# Patient Record
Sex: FEMALE | Race: WHITE | NOT HISPANIC OR LATINO | Employment: FULL TIME | URBAN - METROPOLITAN AREA
[De-identification: names, ages, dates, MRNs, and addresses within clinical notes are randomized per-mention and may not be internally consistent; named-entity substitution may affect disease eponyms.]

---

## 2017-01-16 ENCOUNTER — HOSPITAL ENCOUNTER (OUTPATIENT)
Dept: RADIOLOGY | Facility: CLINIC | Age: 37
Discharge: HOME | End: 2017-01-16
Attending: INTERNAL MEDICINE
Payer: COMMERCIAL

## 2017-01-16 DIAGNOSIS — E04.1 NODULAR THYROID DISEASE: ICD-10-CM

## 2017-01-16 DIAGNOSIS — E06.9 THYROIDITIS: ICD-10-CM

## 2017-01-16 DIAGNOSIS — E06.3 HASHIMOTO'S THYROIDITIS: ICD-10-CM

## 2017-01-16 PROCEDURE — 76536 US EXAM OF HEAD AND NECK: CPT | Mod: 26,,, | Performed by: RADIOLOGY

## 2017-01-16 PROCEDURE — 76536 US EXAM OF HEAD AND NECK: CPT | Mod: TC,PO

## 2017-02-16 ENCOUNTER — TELEPHONE (OUTPATIENT)
Dept: BARIATRICS | Facility: CLINIC | Age: 37
End: 2017-02-16

## 2017-02-16 ENCOUNTER — OFFICE VISIT (OUTPATIENT)
Dept: FAMILY MEDICINE | Facility: CLINIC | Age: 37
End: 2017-02-16
Payer: COMMERCIAL

## 2017-02-16 ENCOUNTER — DOCUMENTATION ONLY (OUTPATIENT)
Dept: FAMILY MEDICINE | Facility: CLINIC | Age: 37
End: 2017-02-16

## 2017-02-16 VITALS
HEART RATE: 63 BPM | WEIGHT: 293 LBS | SYSTOLIC BLOOD PRESSURE: 123 MMHG | BODY MASS INDEX: 57.52 KG/M2 | HEIGHT: 60 IN | DIASTOLIC BLOOD PRESSURE: 85 MMHG | RESPIRATION RATE: 18 BRPM | TEMPERATURE: 98 F

## 2017-02-16 DIAGNOSIS — E03.9 ACQUIRED HYPOTHYROIDISM: Primary | ICD-10-CM

## 2017-02-16 DIAGNOSIS — E28.2 POLYCYSTIC OVARIAN SYNDROME: ICD-10-CM

## 2017-02-16 DIAGNOSIS — E88.810 DYSMETABOLIC SYNDROME: ICD-10-CM

## 2017-02-16 DIAGNOSIS — Z23 NEED FOR PROPHYLACTIC VACCINATION WITH TETANUS-DIPHTHERIA (TD): ICD-10-CM

## 2017-02-16 DIAGNOSIS — J45.20 MILD INTERMITTENT ASTHMA WITHOUT COMPLICATION: ICD-10-CM

## 2017-02-16 DIAGNOSIS — E66.01 MORBID OBESITY, UNSPECIFIED OBESITY TYPE: ICD-10-CM

## 2017-02-16 DIAGNOSIS — J06.9 ACUTE URI: ICD-10-CM

## 2017-02-16 PROCEDURE — 90471 IMMUNIZATION ADMIN: CPT | Mod: S$GLB,,, | Performed by: FAMILY MEDICINE

## 2017-02-16 PROCEDURE — 90714 TD VACC NO PRESV 7 YRS+ IM: CPT | Mod: S$GLB,,, | Performed by: FAMILY MEDICINE

## 2017-02-16 PROCEDURE — 99213 OFFICE O/P EST LOW 20 MIN: CPT | Mod: 25,S$GLB,, | Performed by: FAMILY MEDICINE

## 2017-02-16 PROCEDURE — 99999 PR PBB SHADOW E&M-EST. PATIENT-LVL III: CPT | Mod: PBBFAC,,, | Performed by: FAMILY MEDICINE

## 2017-02-16 RX ORDER — ALBUTEROL SULFATE 90 UG/1
2 AEROSOL, METERED RESPIRATORY (INHALATION) EVERY 6 HOURS PRN
Qty: 18 G | Refills: 11 | Status: SHIPPED | OUTPATIENT
Start: 2017-02-16 | End: 2017-09-21 | Stop reason: SDUPTHER

## 2017-02-16 RX ORDER — BUDESONIDE 0.5 MG/2ML
0.5 INHALANT ORAL DAILY
Qty: 100 ML | Refills: 11 | Status: SHIPPED | OUTPATIENT
Start: 2017-02-16 | End: 2018-09-04 | Stop reason: SDUPTHER

## 2017-02-16 RX ORDER — FLUTICASONE PROPIONATE AND SALMETEROL 500; 50 UG/1; UG/1
1 POWDER RESPIRATORY (INHALATION) 2 TIMES DAILY
Qty: 60 EACH | Refills: 11 | Status: SHIPPED | OUTPATIENT
Start: 2017-02-16 | End: 2017-09-21 | Stop reason: SDUPTHER

## 2017-02-16 NOTE — MR AVS SNAPSHOT
[facility]  [address]  [place] [address]  Phone: [phone]  Fax: [phone]                  Meagan Bran   2017 8:30 AM   Office Visit    Description:  Female : 1980   Provider:  Julius Jeffers Jr., MD   Department:  [facility]           Reason for Visit     Follow-up           Diagnoses this Visit        Comments    Mild intermittent asthma without complication    -  Primary     Need for prophylactic vaccination with tetanus-diphtheria (TD)                To Do List           Future Appointments        Provider Department Dept Phone    2017 8:30 AM Rhys Hidalgo MD [facility]Di[facility] [phone]    2017 10:30 AM Jluius Jeffers Jr., MD [facility] [phone]      Goals (5 Years of Data)     None      Follow-Up and Disposition     Return in about 6 months (around 2017).       These Medications        Disp Refills Start End    budesonide (PULMICORT) 0.5 mg/2 mL nebulizer solution 100 mL 2017     Take 2 mLs (0.5 mg total) by nebulization once daily. - Nebulization    Pharmacy: [id number] [place] - [address] Ph #: [phone]       fluticasone-salmeterol 500-50 mcg/dose (ADVAIR DISKUS) 500-50 mcg/dose DsDv diskus inhaler 60 each 2017     Inhale 1 puff into the lungs 2 (two) times daily. - Inhalation    Pharmacy: [id number] - [place] - [address] Ph #: [phone]       albuterol 90 mcg/actuation inhaler 18 g 11 2017    Inhale 2 puffs into the lungs every 6 (six) hours as needed for Wheezing. Rescue - Inhalation    Pharmacy: [id number] - [place] - [address] Ph #: [phone]         [facility] On [facility]     [facility] On Call Nurse Care Line -  Assistance  Registered nurses in the [facility] On Call Center provide clinical advisement, health education, appointment booking, and other advisory services.  Call for this free  service at [phone].             Medications           Message regarding Medications     Verify the changes and/or additions to your medication regime listed below are the same as discussed with your clinician today.  If any of these changes or additions are incorrect, please notify your healthcare provider.        START taking these NEW medications        Refills    albuterol 90 mcg/actuation inhaler 11    Sig: Inhale 2 puffs into the lungs every 6 (six) hours as needed for Wheezing. Rescue    Class: Normal    Route: Inhalation      CHANGE how you are taking these medications     Start Taking Instead of    budesonide (PULMICORT) 0.5 mg/2 mL nebulizer solution budesonide (PULMICORT) 0.5 mg/2 mL nebulizer solution    Dosage:  Take 2 mLs (0.5 mg total) by nebulization once daily. Dosage:  Take 0.5 mg by nebulization once daily.    Reason for Change:  Reorder            Verify that the below list of medications is an accurate representation of the medications you are currently taking.  If none reported, the list may be blank. If incorrect, please contact your healthcare provider. Carry this list with you in case of emergency.           Current Medications     albuterol (ACCUNEB) 1.25 mg/3 mL Nebu Take 3 mLs (1.25 mg total) by nebulization every 6 (six) hours as needed.    budesonide (PULMICORT) 0.5 mg/2 mL nebulizer solution Take 2 mLs (0.5 mg total) by nebulization once daily.    cholecalciferol, vitamin D3, (VITAMIN D3) 5,000 unit Tab Take 5,000 Units by mouth once daily.    escitalopram oxalate (LEXAPRO) 10 MG tablet TAKE ONE TABLET BY MOUTH ONCE DAILY    fluticasone-salmeterol 500-50 mcg/dose (ADVAIR DISKUS) 500-50 mcg/dose DsDv diskus inhaler Inhale 1 puff into the lungs 2 (two) times daily.    hydrOXYzine HCl (ATARAX) 25 MG tablet Take 1 tablet (25 mg total) by mouth every 6 to 8 hours as needed for Itching.    KRILL OIL ORAL Take 2 capsules by mouth once daily.    levothyroxine (SYNTHROID) 75 MCG tablet  Take 1 tablet (75 mcg total) by mouth once daily.    metformin (GLUCOPHAGE) 500 MG tablet Take 1 tablet (500 mg total) by mouth 2 (two) times daily with meals.    multivitamin (ONE DAILY MULTIVITAMIN) per tablet Take 1 tablet by mouth once daily.    albuterol 90 mcg/actuation inhaler Inhale 2 puffs into the lungs every 6 (six) hours as needed for Wheezing. Rescue           Clinical Reference Information           Your Vitals Were     BP Pulse Temp Resp Height Weight    123/85 (BP Location: Right arm, Patient Position: Sitting, BP Method: Automatic) 63 97.6 °F (36.4 °C) (Oral) 18 5' (1.524 m) 134.8 kg (297 lb 2.9 oz)    BMI                58.04 kg/m2          Blood Pressure          Most Recent Value    BP  123/85      Allergies as of 2/16/2017     No Known Allergies      Immunizations Administered on Date of Encounter - 2/16/2017     None      Instructions      Weight Management: Getting Started  Healthy bodies come in all shapes and sizes. Not all bodies are made to be thin. For some people, a healthy weight is higher than the average weight listed on weight charts. Your healthcare provider can help you decide on a healthy weight for you.    Reasons to lose weight  Losing weight can help with some health problems, such as high blood pressure, heart disease, diabetes, sleep apnea, and arthritis. You may also feel more energy.  Set your long-term goal  Your goal doesn't even have to be a specific weight. You may decide on a fitness goal (such as being able to walk 10 miles a week), or a health goal (such as lowering your blood pressure). Choose a goal that is measurable and reasonable, so you know when you've reached it. A goal of reaching a BMI of less than 25 is not always reasonable (or possible).   Make an action plan  Habits dont change overnight. Setting your goals too high can leave you feeling discouraged if you cant reach them. Be realistic. Choose one or two small changes you can make now. Set an action  plan for how you are going to make these changes. When you can stick to this plan, keep making a few more small changes. Taking small steps will help you stay on the path to success.  Track your progress  Write down your goals. Then, keep a daily record of your progress. Write down what you eat and how active you are. This record lets you look back on how much youve done. It may also help when youre feeling frustrated. Reward yourself for success. Even if you dont reach every goal, give yourself credit for what you do get done.  Get support  Encouragement from others can help make losing weight easier. Ask your family members and friends for support. They may even want to join you. Also look to your healthcare provider, registered dietitian, and  for help. Your local hospital can give you more information about nutrition, exercise, and weight loss.  Date Last Reviewed: 1/31/2016 © 2000-2016 [employer]. [address] [place] [address]. All rights reserved. This information is not intended as a substitute for professional medical care. Always follow your healthcare professional's instructions.        Walking for Fitness  Fitness walking has something for everyone, even people who are already fit. Walking is one of the safest ways to condition your body aerobically. It can boost energy, help you lose weight, and reduce stress.    Physical benefits  · Walking strengthens your heart and lungs, and tones your muscles.  · When walking, your feet land with less impact than in other sports. This reduces chances of muscle, bone, and joint injury.  · Regular walking improves your cholesterol levels and lowers your risk of heart disease. And it helps you control your blood sugar if you have diabetes.  · Walking is a weight-bearing activity, which helps maintain bone density. This can help prevent osteoporosis.  Personal rewards  · Taking walks can help you relax and manage  stress. And fitness walking may make you feel better about yourself.  · Walking can help you sleep better at night and make you less likely to be depressed.  · Regular walking may help maintain your memory as you get older.  · Walking is a great way to spend extra time with friends and family members. Be sure to invite your dog along!  Q&A about fitness walking  Q: Will walking keep me fit?  A: Yes. Regular walking at the right pace gives you all the benefits of other aerobic activities, such as jogging and swimming.  Q: Will walking help me lose weight and keep it off?  A: Yes. Per mile, walking can burn as many calories as jogging. Your health care provider can help work walking into your weight-loss plan.  Q: Is walking safe for my health?  A: Yes. Walking is safe if you have high blood pressure, diabetes, heart disease, or other conditions. Talk to your health care provider before you start.  Date Last Reviewed: 5/9/2015 © 2000-2016 [employer]. [address] [place] [address]. All rights reserved. This information is not intended as a substitute for professional medical care. Always follow your healthcare professional's instructions.             Language Assistance Services     ATTENTION: Language assistance services are available, free of charge. Please call [phone].      ATENCIÓN: Si lolis leonora, tiene a cooper disposición servicios gratuitos de asistencia lingüística. Llame al [phone].     [facility] Ý: N?u b?n nói Ti?ng Vi?t, có các d?ch v? h? tr? ngôn ng? mi?n phí dành cho b?n. G?i s? [phone].         [facility] complies with applicable Federal civil rights laws and does not discriminate on the basis of race, color, national origin, age, disability, or sex.

## 2017-02-16 NOTE — PROGRESS NOTES
Pre-Visit Chart Review  For Appointment Scheduled on 2/16/17    Health Maintenance Due   Topic Date Due    TETANUS VACCINE  09/01/2000    Influenza Vaccine  08/01/2016

## 2017-02-16 NOTE — PROGRESS NOTES
2 patient identifiers used ( name &  ).  Administered Td vaccine right deltoid IM.  Patient tolerated well, no bleeding at insertion site noted.  Pain scale 0/10.  Aseptic technique maintained.  Vaccine information given to patient.

## 2017-02-16 NOTE — PATIENT INSTRUCTIONS
Weight Management: Getting Started  Healthy bodies come in all shapes and sizes. Not all bodies are made to be thin. For some people, a healthy weight is higher than the average weight listed on weight charts. Your healthcare provider can help you decide on a healthy weight for you.    Reasons to lose weight  Losing weight can help with some health problems, such as high blood pressure, heart disease, diabetes, sleep apnea, and arthritis. You may also feel more energy.  Set your long-term goal  Your goal doesn't even have to be a specific weight. You may decide on a fitness goal (such as being able to walk 10 miles a week), or a health goal (such as lowering your blood pressure). Choose a goal that is measurable and reasonable, so you know when you've reached it. A goal of reaching a BMI of less than 25 is not always reasonable (or possible).   Make an action plan  Habits dont change overnight. Setting your goals too high can leave you feeling discouraged if you cant reach them. Be realistic. Choose one or two small changes you can make now. Set an action plan for how you are going to make these changes. When you can stick to this plan, keep making a few more small changes. Taking small steps will help you stay on the path to success.  Track your progress  Write down your goals. Then, keep a daily record of your progress. Write down what you eat and how active you are. This record lets you look back on how much youve done. It may also help when youre feeling frustrated. Reward yourself for success. Even if you dont reach every goal, give yourself credit for what you do get done.  Get support  Encouragement from others can help make losing weight easier. Ask your family members and friends for support. They may even want to join you. Also look to your healthcare provider, registered dietitian, and  for help. Your local hospital can give you more information about nutrition, exercise, and  weight loss.  Date Last Reviewed: 1/31/2016 © 2000-2016 [employer]. [address] [place] [address], [place] [address]. All rights reserved. This information is not intended as a substitute for professional medical care. Always follow your healthcare professional's instructions.        Walking for Fitness  Fitness walking has something for everyone, even people who are already fit. Walking is one of the safest ways to condition your body aerobically. It can boost energy, help you lose weight, and reduce stress.    Physical benefits  · Walking strengthens your heart and lungs, and tones your muscles.  · When walking, your feet land with less impact than in other sports. This reduces chances of muscle, bone, and joint injury.  · Regular walking improves your cholesterol levels and lowers your risk of heart disease. And it helps you control your blood sugar if you have diabetes.  · Walking is a weight-bearing activity, which helps maintain bone density. This can help prevent osteoporosis.  Personal rewards  · Taking walks can help you relax and manage stress. And fitness walking may make you feel better about yourself.  · Walking can help you sleep better at night and make you less likely to be depressed.  · Regular walking may help maintain your memory as you get older.  · Walking is a great way to spend extra time with friends and family members. Be sure to invite your dog along!  Q&A about fitness walking  Q: Will walking keep me fit?  A: Yes. Regular walking at the right pace gives you all the benefits of other aerobic activities, such as jogging and swimming.  Q: Will walking help me lose weight and keep it off?  A: Yes. Per mile, walking can burn as many calories as jogging. Your health care provider can help work walking into your weight-loss plan.  Q: Is walking safe for my health?  A: Yes. Walking is safe if you have high blood pressure, diabetes, heart disease, or other conditions. Talk to your health  care provider before you start.  Date Last Reviewed: 5/9/2015  © 7479-2094 [employer]. [address] [place] [address], [place] [address]. All rights reserved. This information is not intended as a substitute for professional medical care. Always follow your healthcare professional's instructions.

## 2017-02-16 NOTE — TELEPHONE ENCOUNTER
----- Message from Chel Rodriguez sent at 2/16/2017  9:40 AM CST -----  Pt needs an appt for the weight loss clinic  Please call her @ [phone]  Thanks !

## 2017-02-18 NOTE — PROGRESS NOTES
Subjective:       Patient ID: Meagan Bran is a 36 y.o. female.    Chief Complaint: Hypothyroidism    HPI Comments: Patient presents here for six-month follow-up of hypothyroidism.  She was referred to endocrinology and the endocrinology notes were reviewed.  She has also been diagnosed with polycystic ovary syndrome as well as metabolic syndrome.  She states that the endocrinologist suggested that she start on metformin for both of those problems but she wanted to discuss with me first.  I agree that she should take the metformin for the PCOS as well as a metabolic syndrome as these are beneficial for side effects as well as may help with the insulin resistance in PCOS and metabolic syndrome.  She is stable otherwise although her weight has increased 14 pounds since I saw her last.  I have strongly encouraged her to exercise, follow a good weight loss diet, and lose weight.  As far as screening, she does need to update her tetanus.  She also states she has had a cough for the last week and has had some green mucus from her nose.  She denies any fever or chills.  She is an  and all of her children have had colds also.  She has been using her respiratory treatments to help with this.    Review of Systems   Constitutional: Positive for unexpected weight change (weight is increased 14 pounds in 6 months). Negative for chills, fatigue and fever.   HENT: Negative for congestion, ear pain, postnasal drip and sore throat.    Respiratory: Positive for cough and wheezing. Negative for shortness of breath.    Cardiovascular: Negative for chest pain and palpitations.   Gastrointestinal: Negative for abdominal pain, constipation, diarrhea, nausea and vomiting.   Musculoskeletal: Negative for arthralgias and back pain.   Neurological: Negative for dizziness, light-headedness and headaches.   Psychiatric/Behavioral: Negative for sleep disturbance. The patient is nervous/anxious (controlled at thi time).         Objective:      Physical Exam   Constitutional: She is oriented to person, place, and time.   Morbidly obese female in no distress at this time   HENT:   Head: Normocephalic and atraumatic.   Right Ear: External ear normal.   Left Ear: External ear normal.   Nose: Nose normal.   Mouth/Throat: Oropharynx is clear and moist.   Neck: Neck supple. No thyromegaly present.   Cardiovascular: Normal rate, regular rhythm, normal heart sounds and intact distal pulses.    No murmur heard.  Pulmonary/Chest: Effort normal and breath sounds normal. She has no wheezes. She has no rales.   Musculoskeletal: Normal range of motion. She exhibits no edema or tenderness.   Lymphadenopathy:     She has no cervical adenopathy.   Neurological: She is alert and oriented to person, place, and time. No cranial nerve deficit.   Psychiatric: She has a normal mood and affect. Her behavior is normal.   Vitals reviewed.      Assessment:       1. Acquired hypothyroidism    2. Mild intermittent asthma without complication    3. Acute URI    4. Polycystic ovarian syndrome    5. Dysmetabolic syndrome    6. Morbid obesity, unspecified obesity type    7. BMI 50.0-59.9, adult    8. Need for prophylactic vaccination with tetanus-diphtheria (TD)        Plan:       1.  Patient is encouraged to use the metformin as noted above  2.  Continue all other medications  3.  Stressed the importance of diet and exercise for weight loss  4.  Continue to follow-up with endocrinology  5.  Td today  6.  Symptomatic treatment for her upper respiratory infection; no need for an anabiotic so this time  7.  Follow-up with me in 6 months

## 2017-04-10 LAB — PAP SMEAR: NORMAL

## 2017-04-20 ENCOUNTER — HOSPITAL ENCOUNTER (OUTPATIENT)
Dept: RADIOLOGY | Facility: CLINIC | Age: 37
Discharge: HOME | End: 2017-04-20
Attending: SPECIALIST
Payer: COMMERCIAL

## 2017-04-20 DIAGNOSIS — R10.31 COMBINED ABDOMINAL AND PELVIC PAIN, RIGHT: ICD-10-CM

## 2017-04-20 PROCEDURE — 76856 US EXAM PELVIC COMPLETE: CPT | Mod: 26,,, | Performed by: RADIOLOGY

## 2017-04-20 PROCEDURE — 76856 US EXAM PELVIC COMPLETE: CPT | Mod: TC,PO

## 2017-08-07 DIAGNOSIS — F41.9 ANXIETY: ICD-10-CM

## 2017-08-07 RX ORDER — ESCITALOPRAM OXALATE 10 MG/1
TABLET ORAL
Qty: 30 TABLET | Refills: 10 | Status: SHIPPED | OUTPATIENT
Start: 2017-08-07 | End: 2018-07-28 | Stop reason: SDUPTHER

## 2017-09-19 ENCOUNTER — DOCUMENTATION ONLY (OUTPATIENT)
Dept: FAMILY MEDICINE | Facility: CLINIC | Age: 37
End: 2017-09-19

## 2017-09-19 NOTE — PROGRESS NOTES
Pre-Visit Chart Review  For Appointment Scheduled on (date) 9/21/17    Health Maintenance Due   Topic Date Due    Influenza Vaccine  08/01/2017

## 2017-09-21 ENCOUNTER — OFFICE VISIT (OUTPATIENT)
Dept: FAMILY MEDICINE | Facility: CLINIC | Age: 37
End: 2017-09-21
Payer: COMMERCIAL

## 2017-09-21 VITALS
SYSTOLIC BLOOD PRESSURE: 138 MMHG | HEIGHT: 60 IN | DIASTOLIC BLOOD PRESSURE: 87 MMHG | TEMPERATURE: 98 F | HEART RATE: 60 BPM | WEIGHT: 293 LBS | BODY MASS INDEX: 57.52 KG/M2

## 2017-09-21 DIAGNOSIS — E88.810 DYSMETABOLIC SYNDROME: ICD-10-CM

## 2017-09-21 DIAGNOSIS — E03.9 ACQUIRED HYPOTHYROIDISM: Primary | ICD-10-CM

## 2017-09-21 DIAGNOSIS — F32.89 OTHER DEPRESSION: ICD-10-CM

## 2017-09-21 DIAGNOSIS — J45.20 MILD INTERMITTENT ASTHMA WITHOUT COMPLICATION: ICD-10-CM

## 2017-09-21 DIAGNOSIS — E28.2 POLYCYSTIC OVARIAN SYNDROME: ICD-10-CM

## 2017-09-21 DIAGNOSIS — F41.9 ANXIETY: ICD-10-CM

## 2017-09-21 PROCEDURE — 99213 OFFICE O/P EST LOW 20 MIN: CPT | Mod: S$GLB,,, | Performed by: FAMILY MEDICINE

## 2017-09-21 PROCEDURE — 99999 PR PBB SHADOW E&M-EST. PATIENT-LVL III: CPT | Mod: PBBFAC,,, | Performed by: FAMILY MEDICINE

## 2017-09-21 PROCEDURE — 3008F BODY MASS INDEX DOCD: CPT | Mod: S$GLB,,, | Performed by: FAMILY MEDICINE

## 2017-09-21 RX ORDER — ALBUTEROL SULFATE 90 UG/1
2 AEROSOL, METERED RESPIRATORY (INHALATION) EVERY 6 HOURS PRN
Qty: 18 G | Refills: 11 | Status: SHIPPED | OUTPATIENT
Start: 2017-09-21 | End: 2018-04-05 | Stop reason: SDUPTHER

## 2017-09-21 RX ORDER — TIZANIDINE 4 MG/1
4 TABLET ORAL NIGHTLY PRN
Qty: 30 TABLET | Refills: 1 | Status: SHIPPED | OUTPATIENT
Start: 2017-09-21 | End: 2017-10-01

## 2017-09-21 RX ORDER — FLUTICASONE PROPIONATE AND SALMETEROL 500; 50 UG/1; UG/1
1 POWDER RESPIRATORY (INHALATION) 2 TIMES DAILY
Qty: 60 EACH | Refills: 11 | Status: SHIPPED | OUTPATIENT
Start: 2017-09-21 | End: 2020-03-17 | Stop reason: SDUPTHER

## 2017-09-21 NOTE — PATIENT INSTRUCTIONS
Weight Management: Getting Started  Healthy bodies come in all shapes and sizes. Not all bodies are made to be thin. For some people, a healthy weight is higher than the average weight listed on weight charts. Your healthcare provider can help you decide on a healthy weight for you.    Reasons to lose weight  Losing weight can help with some health problems, such as high blood pressure, heart disease, diabetes, sleep apnea, and arthritis. You may also feel more energy.  Set your long-term goal  Your goal doesn't even have to be a specific weight. You may decide on a fitness goal (such as being able to walk 10 miles a week), or a health goal (such as lowering your blood pressure). Choose a goal that is measurable and reasonable, so you know when you've reached it. A goal of reaching a BMI of less than 25 is not always reasonable (or possible).   Make an action plan  Habits dont change overnight. Setting your goals too high can leave you feeling discouraged if you cant reach them. Be realistic. Choose one or two small changes you can make now. Set an action plan for how you are going to make these changes. When you can stick to this plan, keep making a few more small changes. Taking small steps will help you stay on the path to success.  Track your progress  Write down your goals. Then, keep a daily record of your progress. Write down what you eat and how active you are. This record lets you look back on how much youve done. It may also help when youre feeling frustrated. Reward yourself for success. Even if you dont reach every goal, give yourself credit for what you do get done.  Get support  Encouragement from others can help make losing weight easier. Ask your family members and friends for support. They may even want to join you. Also look to your healthcare provider, registered dietitian, and  for help. Your local hospital can give you more information about nutrition, exercise, and  weight loss.  Date Last Reviewed: 1/31/2016 © 2000-2017 [employer]. [address] [place] [address], [place] [address]. All rights reserved. This information is not intended as a substitute for professional medical care. Always follow your healthcare professional's instructions.        Walking for Fitness  Fitness walking has something for everyone, even people who are already fit. Walking is one of the safest ways to condition your body aerobically. It can boost energy, help you lose weight, and reduce stress.    Physical benefits  · Walking strengthens your heart and lungs, and tones your muscles.  · When walking, your feet land with less impact than in other sports. This reduces chances of muscle, bone, and joint injury.  · Regular walking improves your cholesterol levels and lowers your risk of heart disease. And it helps you control your blood sugar if you have diabetes.  · Walking is a weight-bearing activity, which helps maintain bone density. This can help prevent osteoporosis.  Personal rewards  · Taking walks can help you relax and manage stress. And fitness walking may make you feel better about yourself.  · Walking can help you sleep better at night and make you less likely to be depressed.  · Regular walking may help maintain your memory as you get older.  · Walking is a great way to spend extra time with friends and family members. Be sure to invite your dog along!  Q&A about fitness walking  Q: Will walking keep me fit?  A: Yes. Regular walking at the right pace gives you all the benefits of other aerobic activities, such as jogging and swimming.  Q: Will walking help me lose weight and keep it off?  A: Yes. Per mile, walking can burn as many calories as jogging. Your health care provider can help work walking into your weight-loss plan.  Q: Is walking safe for my health?  A: Yes. Walking is safe if you have high blood pressure, diabetes, heart disease, or other conditions. Talk to your  healthcare provider before you start.  Date Last Reviewed: 4/1/2017  © 6732-8495 [employer], [employer]. [address] [place] [address], [place] [address]. All rights reserved. This information is not intended as a substitute for professional medical care. Always follow your healthcare professional's instructions.

## 2017-09-24 NOTE — PROGRESS NOTES
Subjective:       Patient ID: Meagan Bran is a 37 y.o. female.    Chief Complaint: Hypothyroidism; Asthma; PCOS; Metabolic Syndrome; Depression; and Anxiety    Patient presents here for six-month follow-up of hypothyroidism, asthma, polycystic ovarian syndrome, metabolic syndrome, depression, and anxiety.  As far as her polycystic ovaries and metabolic syndrome, she was prescribed metformin by her endocrinologist and also was encouraged to take this by her gynecologist.  She still has not started the medication.  She was concerned about some possible side effects that she saw on the Internet.  I had a detailed conversation with her about the benefits of metformin as well as possible side effects, and I too, agree that this would benefit her greatly.  She states that she will give it a try.  Her weight continues to increase and has gone up 11 pounds in the last 6 months and 25 pounds in the last year.  I have strongly encouraged her to get on a good healthy diet and exercise for weight loss.  She also may be a candidate for bariatric surgery if she is unable to lose weight by diet and exercise.  Losing weight will also significant only benefit her polycystic ovarian syndrome and her metabolic syndrome.  Her hypothyroidism is stable on her present dose of levothyroxine.  Her asthma is stable at this time and only flares up seasonally.  She is not using her Advair at the present time since she is not having a problem.  I have encouraged her to start using this at the first sign of a problem since she sometimes has problems during the fall ALLERGY season, and continue it until the ALLERGY season is done.  She may do the same thing during the spring ALLERGY season.  She is also having some problems with some right back pain for the last 2 months.  It seems to be worse over the last 2 weeks.  She denies any known injury and she does have some improvement with the use of ibuprofen.  The pain is increased with certain  movements.  As far as her depression, this is stable on her present low dose of Lexapro 10 mg daily.  Her anxiety is controlled with intermittent usage of hydroxyzine 25 mg.      Review of Systems   Constitutional: Negative for chills, fatigue, fever and unexpected weight change.        Weight is increased 11 pounds in the last 6 months and 25 pounds in the last year   HENT: Negative for congestion, ear pain, postnasal drip and sore throat.    Respiratory: Positive for wheezing (occasionally). Negative for cough and shortness of breath.    Cardiovascular: Negative for chest pain and palpitations.   Gastrointestinal: Negative for abdominal pain, diarrhea, nausea and vomiting.   Genitourinary: Negative for difficulty urinating, dysuria, flank pain and pelvic pain.   Musculoskeletal: Negative for arthralgias and back pain.        Right flank pain   Neurological: Negative for dizziness, light-headedness and headaches.   Psychiatric/Behavioral: Negative for sleep disturbance. The patient is nervous/anxious.         Depression is stable at this time       Objective:      Physical Exam   Constitutional: She is oriented to person, place, and time.   Morbidly obese female in no distress at this time   HENT:   Head: Normocephalic and atraumatic.   Right Ear: External ear normal.   Left Ear: External ear normal.   Nose: Nose normal.   Mouth/Throat: Oropharynx is clear and moist.   Neck: Normal range of motion. Neck supple. No thyromegaly present.   Cardiovascular: Normal rate, regular rhythm, normal heart sounds and intact distal pulses.    No murmur heard.  Pulmonary/Chest: Effort normal and breath sounds normal. She has no wheezes. She has no rales.   Musculoskeletal: Normal range of motion. She exhibits no edema.   Tender to palpation over the right lateral flank area in the area of the lower right rib   Lymphadenopathy:     She has no cervical adenopathy.   Neurological: She is alert and oriented to person, place, and  time. She has normal reflexes. No cranial nerve deficit.   Psychiatric: She has a normal mood and affect. Her behavior is normal.   Vitals reviewed.      Assessment:       1. Acquired hypothyroidism    2. Mild intermittent asthma without complication    3. Polycystic ovarian syndrome    4. Dysmetabolic syndrome    5. Other depression    6. Anxiety        Plan:       1.  Continue present medication as all of her above medical problems are stable  2.  Strongly encouraged to try the metformin as detailed above  3.  Continue ibuprofen for the right flank pain and use heat as necessary as I feel this is more musculoskeletal  4.  Trial of tizanidine 4 mg at bedtime for the spasm associated with the flank pain  5.  Follow-up with me in 6 months

## 2018-03-27 ENCOUNTER — DOCUMENTATION ONLY (OUTPATIENT)
Dept: FAMILY MEDICINE | Facility: CLINIC | Age: 38
End: 2018-03-27

## 2018-03-27 NOTE — PROGRESS NOTES
Pre-Visit Chart Review  For Appointment Scheduled on (date) 4/5/18    There are no preventive care reminders to display for this patient.

## 2018-04-05 ENCOUNTER — OFFICE VISIT (OUTPATIENT)
Dept: FAMILY MEDICINE | Facility: CLINIC | Age: 38
End: 2018-04-05
Payer: COMMERCIAL

## 2018-04-05 ENCOUNTER — LAB VISIT (OUTPATIENT)
Dept: LAB | Facility: HOSPITAL | Age: 38
End: 2018-04-05
Attending: FAMILY MEDICINE
Payer: COMMERCIAL

## 2018-04-05 VITALS
SYSTOLIC BLOOD PRESSURE: 123 MMHG | DIASTOLIC BLOOD PRESSURE: 78 MMHG | HEART RATE: 59 BPM | HEIGHT: 60 IN | BODY MASS INDEX: 57.52 KG/M2 | WEIGHT: 293 LBS | TEMPERATURE: 98 F

## 2018-04-05 DIAGNOSIS — E88.810 DYSMETABOLIC SYNDROME: ICD-10-CM

## 2018-04-05 DIAGNOSIS — E28.2 POLYCYSTIC OVARIAN SYNDROME: ICD-10-CM

## 2018-04-05 DIAGNOSIS — F32.89 OTHER DEPRESSION: ICD-10-CM

## 2018-04-05 DIAGNOSIS — F41.9 ANXIETY: ICD-10-CM

## 2018-04-05 DIAGNOSIS — E03.9 ACQUIRED HYPOTHYROIDISM: Primary | ICD-10-CM

## 2018-04-05 DIAGNOSIS — E66.01 MORBID OBESITY: ICD-10-CM

## 2018-04-05 DIAGNOSIS — J45.20 MILD INTERMITTENT ASTHMA WITHOUT COMPLICATION: ICD-10-CM

## 2018-04-05 DIAGNOSIS — E03.9 ACQUIRED HYPOTHYROIDISM: ICD-10-CM

## 2018-04-05 LAB
ALBUMIN SERPL BCP-MCNC: 3.8 G/DL
ALP SERPL-CCNC: 72 U/L
ALT SERPL W/O P-5'-P-CCNC: 36 U/L
ANION GAP SERPL CALC-SCNC: 11 MMOL/L
AST SERPL-CCNC: 25 U/L
BILIRUB SERPL-MCNC: 0.4 MG/DL
BUN SERPL-MCNC: 12 MG/DL
CALCIUM SERPL-MCNC: 9.5 MG/DL
CHLORIDE SERPL-SCNC: 104 MMOL/L
CHOLEST SERPL-MCNC: 186 MG/DL
CHOLEST/HDLC SERPL: 4.7 {RATIO}
CO2 SERPL-SCNC: 22 MMOL/L
CREAT SERPL-MCNC: 0.7 MG/DL
EST. GFR  (AFRICAN AMERICAN): >60 ML/MIN/1.73 M^2
EST. GFR  (NON AFRICAN AMERICAN): >60 ML/MIN/1.73 M^2
ESTIMATED AVG GLUCOSE: 108 MG/DL
GLUCOSE SERPL-MCNC: 94 MG/DL
HBA1C MFR BLD HPLC: 5.4 %
HDLC SERPL-MCNC: 40 MG/DL
HDLC SERPL: 21.5 %
LDLC SERPL CALC-MCNC: 121.8 MG/DL
NONHDLC SERPL-MCNC: 146 MG/DL
POTASSIUM SERPL-SCNC: 4.5 MMOL/L
PROT SERPL-MCNC: 7.8 G/DL
SODIUM SERPL-SCNC: 137 MMOL/L
TRIGL SERPL-MCNC: 121 MG/DL
TSH SERPL DL<=0.005 MIU/L-ACNC: 0.43 UIU/ML

## 2018-04-05 PROCEDURE — 83036 HEMOGLOBIN GLYCOSYLATED A1C: CPT

## 2018-04-05 PROCEDURE — 36415 COLL VENOUS BLD VENIPUNCTURE: CPT | Mod: PO

## 2018-04-05 PROCEDURE — 99999 PR PBB SHADOW E&M-EST. PATIENT-LVL IV: CPT | Mod: PBBFAC,,, | Performed by: FAMILY MEDICINE

## 2018-04-05 PROCEDURE — 99213 OFFICE O/P EST LOW 20 MIN: CPT | Mod: S$GLB,,, | Performed by: FAMILY MEDICINE

## 2018-04-05 PROCEDURE — 80053 COMPREHEN METABOLIC PANEL: CPT

## 2018-04-05 PROCEDURE — 84443 ASSAY THYROID STIM HORMONE: CPT

## 2018-04-05 PROCEDURE — 80061 LIPID PANEL: CPT

## 2018-04-05 RX ORDER — METFORMIN HYDROCHLORIDE 500 MG/1
500 TABLET ORAL 2 TIMES DAILY WITH MEALS
COMMUNITY
End: 2020-10-20 | Stop reason: DRUGHIGH

## 2018-04-05 RX ORDER — ALBUTEROL SULFATE 90 UG/1
2 AEROSOL, METERED RESPIRATORY (INHALATION) EVERY 6 HOURS PRN
Qty: 18 G | Refills: 11 | Status: SHIPPED | OUTPATIENT
Start: 2018-04-05 | End: 2019-04-05

## 2018-04-05 RX ORDER — LEVOTHYROXINE SODIUM 75 UG/1
75 TABLET ORAL DAILY
Qty: 30 TABLET | Refills: 11 | Status: SHIPPED | OUTPATIENT
Start: 2018-04-05 | End: 2020-10-31 | Stop reason: SDUPTHER

## 2018-04-05 NOTE — PROGRESS NOTES
Subjective:       Patient ID: Meagan Bran is a 37 y.o. female.    Chief Complaint: Hypothyroidism; Polycystic ovarian syndrome; Dysmetabolic syndrome; Morbid obesity; Anxiety; and Depression    Patient presents here for six-month follow-up of hypothyroidism, dysmetabolic syndrome, polycystic ovarian syndrome, morbid obesity, asthma, depression, and anxiety.  The patient has lost a total of only 4 pounds since her last visit, and remains slightly over 300 pounds.  I did discuss with the patient again about her morbid obesity and the problems this presents with her dysmetabolic syndrome and polycystic ovarian syndrome.  She states she has been following weight watchers intermittently but not on a consistent basis.  We did discuss referral to the comprehensive weight loss clinic, and the patient is amenable to this.  She has considered surgery but is not sure she wants to do this at this time.  I emphasized that the comprehensive weight loss clinic covers everything from diet to surgery and does not commit her to a surgical approach.  As far as her hypothyroidism, she has not been taking her levothyroxin a regular basis.  I strongly emphasized the need to take this regularly, and the effect that this can have on her weight loss.  Her asthma is stable and she has had no exacerbations within the last 6 months.  She is using her medications only on an as-needed basis.  Her last appoint with endocrinology for her polycystic ovaries and dysmetabolic syndrome was July 2016 and I emphasized the need to go back and see endocrinology.  Her depression and anxiety are stable at this point in time on her present medication.      Review of Systems   Constitutional: Negative for chills, fatigue, fever and unexpected weight change.        Weight decreased 4 pounds in 6 months   HENT: Negative for congestion, ear pain, postnasal drip and sore throat.    Respiratory: Negative for cough and shortness of breath.    Cardiovascular:  Negative for chest pain and palpitations.   Gastrointestinal: Negative for abdominal pain, diarrhea, nausea and vomiting.   Musculoskeletal: Negative for arthralgias and back pain.   Neurological: Negative for dizziness, light-headedness and headaches.   Psychiatric/Behavioral: Negative for sleep disturbance. The patient is nervous/anxious.         Depression well controlled       Objective:      Physical Exam   Constitutional:   Morbidly obese female in no distress   HENT:   Head: Normocephalic and atraumatic.   Right Ear: External ear normal.   Left Ear: External ear normal.   Nose: Nose normal.   Mouth/Throat: Oropharynx is clear and moist.   Neck: Normal range of motion. Neck supple. No thyromegaly present.   Cardiovascular: Normal rate, regular rhythm, normal heart sounds and intact distal pulses.    No murmur heard.  Pulmonary/Chest: Effort normal and breath sounds normal. She has no wheezes. She has no rales.   Musculoskeletal: Normal range of motion. She exhibits no edema or tenderness.   Lymphadenopathy:     She has no cervical adenopathy.   Neurological: She has normal reflexes.   Psychiatric: She has a normal mood and affect. Her behavior is normal.   Vitals reviewed.      Assessment:       1. Acquired hypothyroidism    2. Mild intermittent asthma without complication    3. Dysmetabolic syndrome    4. Polycystic ovarian syndrome    5. Morbid obesity    6. Anxiety    7. Other depression        Plan:       1.  Strongly emphasized the need to take her thyroid medication on a daily basis  2.  Also strongly emphasize the need to follow a diet plan on a daily basis for weight loss  3.  Referral to comprehensive weight loss center  4.  Patient needs to follow-up with endocrinology  5.  Also emphasized the need to exercise on a regular basis to help with weight loss  6.  Follow-up with me in 6 months or when necessary

## 2018-04-05 NOTE — PATIENT INSTRUCTIONS
Weight Management: Getting Started  Healthy bodies come in all shapes and sizes. Not all bodies are made to be thin. For some people, a healthy weight is higher than the average weight listed on weight charts. Your healthcare provider can help you decide on a healthy weight for you.    Reasons to lose weight  Losing weight can help with some health problems, such as high blood pressure, heart disease, diabetes, sleep apnea, and arthritis. You may also feel more energy.  Set your long-term goal  Your goal doesn't even have to be a specific weight. You may decide on a fitness goal (such as being able to walk 10 miles a week), or a health goal (such as lowering your blood pressure). Choose a goal that is measurable and reasonable, so you know when you've reached it. A goal of reaching a BMI of less than 25 is not always reasonable (or possible).   Make an action plan  Habits dont change overnight. Setting your goals too high can leave you feeling discouraged if you cant reach them. Be realistic. Choose one or two small changes you can make now. Set an action plan for how you are going to make these changes. When you can stick to this plan, keep making a few more small changes. Taking small steps will help you stay on the path to success.  Track your progress  Write down your goals. Then, keep a daily record of your progress. Write down what you eat and how active you are. This record lets you look back on how much youve done. It may also help when youre feeling frustrated. Reward yourself for success. Even if you dont reach every goal, give yourself credit for what you do get done.  Get support  Encouragement from others can help make losing weight easier. Ask your family members and friends for support. They may even want to join you. Also look to your healthcare provider, registered dietitian, and  for help. Your local hospital can give you more information about nutrition, exercise, and  weight loss.  Date Last Reviewed: 1/31/2016 © 2000-2017 [employer]. [address] [place] [address], [place] [address]. All rights reserved. This information is not intended as a substitute for professional medical care. Always follow your healthcare professional's instructions.        Walking for Fitness  Fitness walking has something for everyone, even people who are already fit. Walking is one of the safest ways to condition your body aerobically. It can boost energy, help you lose weight, and reduce stress.    Physical benefits  · Walking strengthens your heart and lungs, and tones your muscles.  · When walking, your feet land with less impact than in other sports. This reduces chances of muscle, bone, and joint injury.  · Regular walking improves your cholesterol levels and lowers your risk of heart disease. And it helps you control your blood sugar if you have diabetes.  · Walking is a weight-bearing activity, which helps maintain bone density. This can help prevent osteoporosis.  Personal rewards  · Taking walks can help you relax and manage stress. And fitness walking may make you feel better about yourself.  · Walking can help you sleep better at night and make you less likely to be depressed.  · Regular walking may help maintain your memory as you get older.  · Walking is a great way to spend extra time with friends and family members. Be sure to invite your dog along!  Q&A about fitness walking  Q: Will walking keep me fit?  A: Yes. Regular walking at the right pace gives you all the benefits of other aerobic activities, such as jogging and swimming.  Q: Will walking help me lose weight and keep it off?  A: Yes. Per mile, walking can burn as many calories as jogging. Your health care provider can help work walking into your weight-loss plan.  Q: Is walking safe for my health?  A: Yes. Walking is safe if you have high blood pressure, diabetes, heart disease, or other conditions. Talk to your  healthcare provider before you start.  Date Last Reviewed: 4/1/2017  © 5897-2851 [employer], [employer]. [address] [place] [address], [place] [address]. All rights reserved. This information is not intended as a substitute for professional medical care. Always follow your healthcare professional's instructions.

## 2018-06-05 ENCOUNTER — OFFICE VISIT (OUTPATIENT)
Dept: BARIATRICS | Facility: CLINIC | Age: 38
End: 2018-06-05
Payer: COMMERCIAL

## 2018-06-05 VITALS
HEART RATE: 70 BPM | RESPIRATION RATE: 16 BRPM | SYSTOLIC BLOOD PRESSURE: 134 MMHG | DIASTOLIC BLOOD PRESSURE: 83 MMHG | WEIGHT: 293 LBS | TEMPERATURE: 98 F | BODY MASS INDEX: 57.52 KG/M2 | HEIGHT: 60 IN

## 2018-06-05 DIAGNOSIS — E66.01 MORBID OBESITY WITH BMI OF 60.0-69.9, ADULT: ICD-10-CM

## 2018-06-05 DIAGNOSIS — G47.33 OBSTRUCTIVE SLEEP APNEA: ICD-10-CM

## 2018-06-05 DIAGNOSIS — E66.01 MORBID OBESITY: Primary | ICD-10-CM

## 2018-06-05 PROCEDURE — 99204 OFFICE O/P NEW MOD 45 MIN: CPT | Mod: S$GLB,,, | Performed by: SURGERY

## 2018-06-05 PROCEDURE — 3008F BODY MASS INDEX DOCD: CPT | Mod: CPTII,S$GLB,, | Performed by: SURGERY

## 2018-06-05 PROCEDURE — 99999 PR PBB SHADOW E&M-EST. PATIENT-LVL III: CPT | Mod: PBBFAC,,, | Performed by: SURGERY

## 2018-06-05 NOTE — PROGRESS NOTES
Initial Consult    Chief Complaint   Patient presents with    Obesity       History of Present Illness:  Patient is a 37 y.o. female who is referred for evaluation of surgical treatment of morbid obesity. Her Body mass index is 60.62 kg/m². She has known comorbidities of obstructive sleep apnea and peripheral edema. She has not attended the bariatric seminar and is most interested in hearing options.      Past attempts at weight loss include: Unsupervised: calorie counting, gym membership, home gym equipment, low carbohydrates, rob abraham;  Supervised:  Diet center, nutri-system, overeaters [facility], weight watchers;  Diet pills: phentermine;  Exercise attempts: walking or running, treadmill, swimming, group classes; counselor/therapist    Weight history:   At current weight:  2 years  Obese for 15 years.  More than 35 pounds overweight for 20 years.  More than 100 pounds overweight for 5 years.  Started dieting at 13 years old.  Maximum weight reached: 319 pounds  Most weight lost was 50 pounds through weight watchers for a few months.  She describes Her eating habits as sweet eater, snacker/grazer, emotional eater, binge eater.    VANNESA screening: wears mask    Reflux screening: none    Review of patient's allergies indicates:  No Known Allergies    Current Outpatient Prescriptions   Medication Sig Dispense Refill    albuterol (ACCUNEB) 1.25 mg/3 mL Nebu Take 3 mLs (1.25 mg total) by nebulization every 6 (six) hours as needed. 72 mL 11    albuterol 90 mcg/actuation inhaler Inhale 2 puffs into the lungs every 6 (six) hours as needed for Wheezing. Rescue 18 g 11    budesonide (PULMICORT) 0.5 mg/2 mL nebulizer solution Take 2 mLs (0.5 mg total) by nebulization once daily. 100 mL 11    cholecalciferol, vitamin D3, (VITAMIN D3) 5,000 unit Tab Take 5,000 Units by mouth once daily.      escitalopram oxalate (LEXAPRO) 10 MG tablet TAKE ONE TABLET BY MOUTH ONCE DAILY 30 tablet 10    fluticasone-salmeterol 500-50  mcg/dose (ADVAIR DISKUS) 500-50 mcg/dose DsDv diskus inhaler Inhale 1 puff into the lungs 2 (two) times daily. 60 each 11    hydrOXYzine HCl (ATARAX) 25 MG tablet Take 1 tablet (25 mg total) by mouth every 6 to 8 hours as needed for Itching. 30 tablet 3    KRILL OIL ORAL Take 2 capsules by mouth once daily.      levothyroxine (SYNTHROID) 75 MCG tablet Take 1 tablet (75 mcg total) by mouth once daily. 30 tablet 11    metFORMIN (GLUCOPHAGE) 500 MG tablet Take 500 mg by mouth 2 (two) times daily with meals.      multivitamin (ONE DAILY MULTIVITAMIN) per tablet Take 1 tablet by mouth once daily.       No current facility-administered medications for this visit.        Past Medical History:   Diagnosis Date    Asthma     Sleep apnea     Thyroid disease      History reviewed. No pertinent surgical history.  Family History   Problem Relation Age of Onset    Obesity Mother     Hypertension Father     Diabetes Father     Depression Father     Anxiety disorder Father     Obesity Father     COPD Paternal Aunt         ovarian    Obesity Sister     Obesity Brother     Obesity Sister     Obesity Brother     Obesity Brother      Social History   Substance Use Topics    Smoking status: Never Smoker    Smokeless tobacco: Never Used    Alcohol use No        Chart review:    4/5/18: Dr. Jeffers-pcp: Treated for hypothyroidism, intermittent asthma, dysmetabolic syndrome, pcos, morbid obesity, anxiety, depression    Lab review:    Lab Results   Component Value Date    TSH 0.433 04/05/2018     Lab Results   Component Value Date    CHOL 186 04/05/2018    CHOL 186 07/29/2016    CHOL 173 09/11/2015     Lab Results   Component Value Date    HDL 40 04/05/2018    HDL 34 (L) 07/29/2016    HDL 33 (L) 09/11/2015     Lab Results   Component Value Date    LDLCALC 121.8 04/05/2018    LDLCALC 129.6 07/29/2016    LDLCALC 114.2 09/11/2015     Lab Results   Component Value Date    TRIG 121 04/05/2018    TRIG 112 07/29/2016    TRIG  129 09/11/2015     Lab Results   Component Value Date    CHOLHDL 21.5 04/05/2018    CHOLHDL 18.3 (L) 07/29/2016    CHOLHDL 19.1 (L) 09/11/2015     Lab Results   Component Value Date    HGBA1C 5.4 04/05/2018         Radiology review:    None really    Review of Systems:  Review of Systems   Constitutional: Negative for chills, diaphoresis and fever.   HENT: Negative for congestion, sinus pressure, sneezing, sore throat, tinnitus and voice change.    Eyes: Negative for pain, redness and itching.   Respiratory: Positive for wheezing. Negative for apnea, cough, choking, chest tightness, shortness of breath and stridor.    Cardiovascular: Positive for palpitations. Negative for chest pain and leg swelling.   Gastrointestinal: Negative for abdominal pain, anal bleeding, constipation, diarrhea, nausea and vomiting.   Endocrine: Negative for cold intolerance and heat intolerance.   Genitourinary: Negative for difficulty urinating and dysuria.   Musculoskeletal: Negative for arthralgias, back pain and gait problem.   Skin: Negative for rash and wound.   Allergic/Immunologic: Positive for environmental allergies. Negative for food allergies.   Neurological: Negative for dizziness, light-headedness and headaches.   Hematological: Negative for adenopathy. Does not bruise/bleed easily.   Psychiatric/Behavioral: Negative for agitation and confusion.       Physical:     Vital Signs (Most Recent)  Temp: 98.3 °F (36.8 °C) (06/05/18 1041)  Pulse: 70 (06/05/18 1041)  Resp: 16 (06/05/18 1041)  BP: 134/83 (06/05/18 1041)  5' (1.524 m)  (!) 140.8 kg (310 lb 6.5 oz)     Body comp:  Fat Percent:  53.2 %  Fat Mass:  163.4 lb  FFM:  143.6 lb  TBW: 107.8 lb  TBW %:  35.1 %  BMR: 2117 kcal    Physical Exam:  Physical Exam   Constitutional: She is oriented to person, place, and time. She appears well-developed and well-nourished. No distress.   HENT:   Head: Normocephalic and atraumatic.   Mouth/Throat: No oropharyngeal exudate.   Eyes:  Conjunctivae and EOM are normal. Pupils are equal, round, and reactive to light. No scleral icterus.   Neck: Normal range of motion. Neck supple. No JVD present. No tracheal deviation present. No thyromegaly present.   Cardiovascular: Normal rate, regular rhythm and normal heart sounds.  Exam reveals no gallop and no friction rub.    No murmur heard.  Pulmonary/Chest: Effort normal and breath sounds normal. No stridor. No respiratory distress. She has no wheezes. She has no rales. She exhibits no tenderness.   Abdominal: Soft. Bowel sounds are normal. She exhibits no distension and no mass. There is no tenderness. There is no rebound and no guarding.   Musculoskeletal: Normal range of motion. She exhibits edema. She exhibits no tenderness.   Lymphadenopathy:     She has no cervical adenopathy.   Neurological: She is alert and oriented to person, place, and time. No cranial nerve deficit.   Skin: Skin is warm and dry. No rash noted. She is not diaphoretic. No erythema.   Psychiatric: She has a normal mood and affect. Her behavior is normal.   Nursing note and vitals reviewed.      ASSESSMENT/PLAN:        1. Morbid obesity    2. Morbid obesity with BMI of 60.0-69.9, adult    3. Obstructive sleep apnea        Plan:  Meagan Bran has morbid obesity as their Body mass index is 60.62 kg/m². She would benefit from weight loss surgery and has chosen gastric sleeve surgery as the preferred procedure. She understands that this is a tool and lifestyle change will be necessary to keep weight off. I went over possible complications of all surgeries with the patient and she is agreeable to surgery.    She will need:    Labs- done  EKG- will request- Dr. Guy  dietary consult  psych consult   Seminar    I will obtain the following clearances prior to surgery: none    Diet plan: high protein low carb- mainly meats and vegetables  Exercise plan: Cardiovascular exercise, get HR over 100 for 20 minutes 3 times per week.  Start  multivitamin

## 2018-06-05 NOTE — LETTER
June 7, 2018      Julius Jeffers Jr., MD  [address] [facility] [address]           [facility]  [address], [address]  [facility] [address]  Phone: [phone]  Fax: [phone]          Patient: Meagan Bran   MR Number: [id number]   YOB: 1980   Date of Visit: 6/5/2018       Dear Dr. Julius Jeffers Jr.:    Thank you for referring Meagan Bran to me for evaluation. Attached you will find relevant portions of my assessment and plan of care.    If you have questions, please do not hesitate to call me. I look forward to following Meagan Bran along with you.    Sincerely,    Trip Drummond MD    Enclosure  CC:  No Recipients    If you would like to receive this communication electronically, please contact [email or web address] or [phone] to request more information on [facility] Link access.    For providers and/or their staff who would like to refer a patient to [facility], please contact us through our one-stop-shop provider referral line, [facility], at [phone].    If you feel you have received this communication in error or would no longer like to receive these types of communications, please e-mail [email or web address]

## 2018-06-14 ENCOUNTER — CLINICAL SUPPORT (OUTPATIENT)
Dept: BARIATRICS | Facility: CLINIC | Age: 38
End: 2018-06-14
Payer: COMMERCIAL

## 2018-06-14 DIAGNOSIS — Z71.3 DIETARY COUNSELING: ICD-10-CM

## 2018-06-14 DIAGNOSIS — E03.9 ACQUIRED HYPOTHYROIDISM: ICD-10-CM

## 2018-06-14 DIAGNOSIS — E66.01 MORBID OBESITY, UNSPECIFIED OBESITY TYPE: ICD-10-CM

## 2018-06-14 DIAGNOSIS — E16.1 HYPERINSULINEMIA: ICD-10-CM

## 2018-06-14 DIAGNOSIS — E66.01 MORBID OBESITY: ICD-10-CM

## 2018-06-14 DIAGNOSIS — E88.810 DYSMETABOLIC SYNDROME: ICD-10-CM

## 2018-06-14 PROCEDURE — 97802 MEDICAL NUTRITION INDIV IN: CPT | Mod: S$GLB,,, | Performed by: DIETITIAN, REGISTERED

## 2018-06-14 PROCEDURE — 99999 PR PBB SHADOW E&M-EST. PATIENT-LVL II: CPT | Mod: PBBFAC,,, | Performed by: DIETITIAN, REGISTERED

## 2018-06-15 VITALS — WEIGHT: 293 LBS | HEIGHT: 60 IN | BODY MASS INDEX: 57.52 KG/M2

## 2018-06-15 NOTE — PROGRESS NOTES
Nutrition Assessment for Medical Nutrition Therapy    Referring professional:Dr. Drummond    Reason for MNT visit: Pt in for education and nutrition counseling regarding Obesity.     Medical History: Hypothyroidism and sleep apnea, hypovitaminosis D,  Hyperinsulinemia        Pertinent Medications: Metformin, Vit D    Labs: reviewed     Ht: 5' (1.524 m)     Wt: (!) 140.8 kg (310 lb 4.8 oz)    BMI: Body mass index is 60.6 kg/m².    Estimated energy requirements:1725 kcals/ day ( 25 kcal/ kg ADJ BW 69 kg)  55- 69 grm pro/ day ( .8-1.0 g/kg pro)       Nutrition History       Weight status: currently stable    Meal patterns: 1-2 meals daily, 1-2 snacks daily and usually skips breakfast, sometimes skips lunch, usual is dinner with a snack at night     Carbohydrate: large intake of starches    Protein: inadequate,  high-fat sources    Fruit: eats variety    Vegetables: eats variety     Meal preparation/shopping: self, sister    Nutrition Na: Significant sources, snack foods, chips, popcorn, cheese    Nutrition beverages: water, sweet tea daily    Dining out: Regular, 3-4 times per week, Frequent fast food, 2-3 times per week    Smoking/alcohol: nonsmoker    Psychosocial/Economic: n/a    Motivation to change: Moderate    MNT Recommendations:    · 30 grams carbohydrates per meal  · 15 grams carbohydrates per snack  · 6-7 oz protein daily  · Increase intake of fruits and vegetables  · Low fat diet  · Increase activity  · Eat 3 meals daily  · Limit snacking    Plan:  · Reviewed  carbohydrate foods, portions and goals per meal and snack, low-fat guidelines, principles of energy metabolism with strategies to assist weight management, protein foods and portion goals  · Discussed  healthy food choices pamphlet, importance of physical activity, physical activity benefits, methods and precautions  · Provided  dining out strategies, food preparation strategies, grocery shopping guidelines, meal plan and meal planning strategies,  recipes, snack suggestions, strategies for increasing fruits and vegetables at meals and snacks    Behavior goals: portion control, weight loss, label reading, sugar free drinks    Nutrition follow-up: written materials provided: Healthy snack list, meal planning, recipes, will follow up as needed    Comprehension: good     Counseling time: 1 Hour

## 2018-07-28 DIAGNOSIS — F41.9 ANXIETY: ICD-10-CM

## 2018-07-29 RX ORDER — ESCITALOPRAM OXALATE 10 MG/1
TABLET ORAL
Qty: 30 TABLET | Refills: 11 | Status: SHIPPED | OUTPATIENT
Start: 2018-07-29 | End: 2019-08-08 | Stop reason: SDUPTHER

## 2018-09-04 DIAGNOSIS — J45.20 MILD INTERMITTENT ASTHMA WITHOUT COMPLICATION: ICD-10-CM

## 2018-09-04 RX ORDER — ALBUTEROL SULFATE 1.25 MG/3ML
1.25 SOLUTION RESPIRATORY (INHALATION) EVERY 6 HOURS PRN
Qty: 72 ML | Refills: 11 | Status: SHIPPED | OUTPATIENT
Start: 2018-09-04 | End: 2020-03-17 | Stop reason: SDUPTHER

## 2018-09-04 RX ORDER — BUDESONIDE 0.5 MG/2ML
0.5 INHALANT ORAL DAILY
Qty: 100 ML | Refills: 11 | Status: SHIPPED | OUTPATIENT
Start: 2018-09-04 | End: 2020-03-17 | Stop reason: SDUPTHER

## 2018-09-04 RX ORDER — ALBUTEROL SULFATE 1.25 MG/3ML
1.25 SOLUTION RESPIRATORY (INHALATION) EVERY 6 HOURS PRN
Qty: 72 ML | Refills: 11 | Status: SHIPPED | OUTPATIENT
Start: 2018-09-04 | End: 2018-09-04 | Stop reason: SDUPTHER

## 2018-10-17 ENCOUNTER — TELEPHONE (OUTPATIENT)
Dept: ADMINISTRATIVE | Facility: HOSPITAL | Age: 38
End: 2018-10-17

## 2019-01-22 ENCOUNTER — TELEPHONE (OUTPATIENT)
Dept: FAMILY MEDICINE | Facility: CLINIC | Age: 39
End: 2019-01-22

## 2019-01-22 NOTE — TELEPHONE ENCOUNTER
Pt c/o right side pain in the abd that comes and goes. She stated that it's a tell-tell sign that she might have UTI. Offered her UC appt tomorrow, pt denied. She stated that she will go to urgent care after work instead.

## 2019-01-22 NOTE — TELEPHONE ENCOUNTER
----- Message from Armando Guthrie sent at 1/22/2019  3:42 PM CST -----  Type:  Patient Call Back    Who Called:  pt  Does the patient know what this is regarding?:pt has a uti and wants to know what to do for treatment .  Best Call Back Number:  [phone]  Additional Information:  Please call pt and leave a detailed message if there is no answer.

## 2019-05-20 RX ORDER — ALBUTEROL SULFATE 90 UG/1
AEROSOL, METERED RESPIRATORY (INHALATION)
Qty: 18 G | Refills: 10 | Status: SHIPPED | OUTPATIENT
Start: 2019-05-20 | End: 2020-12-08 | Stop reason: SDUPTHER

## 2019-08-08 DIAGNOSIS — F41.9 ANXIETY: ICD-10-CM

## 2019-08-08 RX ORDER — ESCITALOPRAM OXALATE 10 MG/1
TABLET ORAL
Qty: 30 TABLET | Refills: 10 | Status: SHIPPED | OUTPATIENT
Start: 2019-08-08 | End: 2020-07-21

## 2019-08-19 DIAGNOSIS — F41.9 ANXIETY: ICD-10-CM

## 2019-08-19 RX ORDER — HYDROXYZINE HYDROCHLORIDE 25 MG/1
25 TABLET, FILM COATED ORAL
Qty: 30 TABLET | Refills: 3 | Status: SHIPPED | OUTPATIENT
Start: 2019-08-19 | End: 2022-06-20 | Stop reason: SDUPTHER

## 2020-03-17 DIAGNOSIS — J45.20 MILD INTERMITTENT ASTHMA WITHOUT COMPLICATION: ICD-10-CM

## 2020-03-17 RX ORDER — ALBUTEROL SULFATE 1.25 MG/3ML
1.25 SOLUTION RESPIRATORY (INHALATION) EVERY 6 HOURS PRN
Qty: 72 ML | Refills: 11 | Status: SHIPPED | OUTPATIENT
Start: 2020-03-17 | End: 2020-12-08 | Stop reason: SDUPTHER

## 2020-03-17 RX ORDER — BUDESONIDE 0.5 MG/2ML
0.5 INHALANT ORAL DAILY
Qty: 100 ML | Refills: 11 | Status: SHIPPED | OUTPATIENT
Start: 2020-03-17 | End: 2020-12-08 | Stop reason: SDUPTHER

## 2020-03-17 RX ORDER — FLUTICASONE PROPIONATE AND SALMETEROL 500; 50 UG/1; UG/1
1 POWDER RESPIRATORY (INHALATION) 2 TIMES DAILY
Qty: 60 EACH | Refills: 11 | Status: SHIPPED | OUTPATIENT
Start: 2020-03-17 | End: 2020-12-08 | Stop reason: SDUPTHER

## 2020-07-21 DIAGNOSIS — F41.9 ANXIETY: ICD-10-CM

## 2020-07-21 RX ORDER — ESCITALOPRAM OXALATE 10 MG/1
TABLET ORAL
Qty: 30 TABLET | Refills: 10 | Status: SHIPPED | OUTPATIENT
Start: 2020-07-21 | End: 2021-07-22 | Stop reason: SDUPTHER

## 2020-10-05 ENCOUNTER — PATIENT MESSAGE (OUTPATIENT)
Dept: ADMINISTRATIVE | Facility: HOSPITAL | Age: 40
End: 2020-10-05

## 2020-10-15 ENCOUNTER — PATIENT MESSAGE (OUTPATIENT)
Dept: FAMILY MEDICINE | Facility: CLINIC | Age: 40
End: 2020-10-15

## 2020-10-16 NOTE — TELEPHONE ENCOUNTER
Please see Patient my chart message regarding her metformin, I sent a response to the patient informing her that I scheduled her and appointment to see Dr. Hidalgo on Tuesday October 20th, since her last visit was 11/2016 with him, patient stated she never received a call back when she requested an appointment. Please advise (need new referral for Dr. Hidalgo).

## 2020-10-20 ENCOUNTER — PATIENT MESSAGE (OUTPATIENT)
Dept: ENDOCRINOLOGY | Facility: CLINIC | Age: 40
End: 2020-10-20

## 2020-10-20 ENCOUNTER — LAB VISIT (OUTPATIENT)
Dept: LAB | Facility: HOSPITAL | Age: 40
End: 2020-10-20
Attending: INTERNAL MEDICINE
Payer: COMMERCIAL

## 2020-10-20 ENCOUNTER — OFFICE VISIT (OUTPATIENT)
Dept: ENDOCRINOLOGY | Facility: CLINIC | Age: 40
End: 2020-10-20
Payer: COMMERCIAL

## 2020-10-20 VITALS
HEIGHT: 60 IN | HEART RATE: 83 BPM | SYSTOLIC BLOOD PRESSURE: 140 MMHG | TEMPERATURE: 98 F | DIASTOLIC BLOOD PRESSURE: 82 MMHG | BODY MASS INDEX: 57.52 KG/M2 | WEIGHT: 293 LBS

## 2020-10-20 DIAGNOSIS — E88.810 DYSMETABOLIC SYNDROME: ICD-10-CM

## 2020-10-20 DIAGNOSIS — E66.01 MORBID OBESITY: ICD-10-CM

## 2020-10-20 DIAGNOSIS — R73.03 PREDIABETES: ICD-10-CM

## 2020-10-20 DIAGNOSIS — E16.1 HYPERINSULINEMIA: ICD-10-CM

## 2020-10-20 DIAGNOSIS — F32.89 OTHER DEPRESSION: ICD-10-CM

## 2020-10-20 DIAGNOSIS — E88.810 DYSMETABOLIC SYNDROME: Primary | ICD-10-CM

## 2020-10-20 DIAGNOSIS — E06.9 THYROIDITIS: ICD-10-CM

## 2020-10-20 DIAGNOSIS — E28.2 POLYCYSTIC OVARIAN SYNDROME: ICD-10-CM

## 2020-10-20 DIAGNOSIS — F41.9 ANXIETY: ICD-10-CM

## 2020-10-20 DIAGNOSIS — E04.1 NODULAR THYROID DISEASE: ICD-10-CM

## 2020-10-20 DIAGNOSIS — E06.3 HASHIMOTO'S THYROIDITIS: ICD-10-CM

## 2020-10-20 DIAGNOSIS — E88.819 INSULIN RESISTANCE: ICD-10-CM

## 2020-10-20 DIAGNOSIS — G47.33 OBSTRUCTIVE SLEEP APNEA: ICD-10-CM

## 2020-10-20 DIAGNOSIS — E03.9 ACQUIRED HYPOTHYROIDISM: Primary | ICD-10-CM

## 2020-10-20 DIAGNOSIS — E55.9 HYPOVITAMINOSIS D: ICD-10-CM

## 2020-10-20 DIAGNOSIS — L68.0 HIRSUTISM: ICD-10-CM

## 2020-10-20 DIAGNOSIS — E04.9 GOITER: ICD-10-CM

## 2020-10-20 DIAGNOSIS — E28.8 HYPERANDROGENISM: ICD-10-CM

## 2020-10-20 DIAGNOSIS — R79.89 ABNORMAL THYROID BLOOD TEST: ICD-10-CM

## 2020-10-20 LAB
DHEA-S SERPL-MCNC: 239.1 UG/DL (ref 74.8–410.2)
ESTIMATED AVG GLUCOSE: 134 MG/DL (ref 68–131)
ESTRADIOL SERPL-MCNC: 112 PG/ML
FSH SERPL-ACNC: 3.2 MIU/ML
HBA1C MFR BLD HPLC: 6.3 % (ref 4–5.6)
LH SERPL-ACNC: 4.5 MIU/ML
PROGEST SERPL-MCNC: 0.3 NG/ML
PROLACTIN SERPL IA-MCNC: 7.7 NG/ML (ref 5.2–26.5)

## 2020-10-20 PROCEDURE — 84146 ASSAY OF PROLACTIN: CPT

## 2020-10-20 PROCEDURE — 36415 COLL VENOUS BLD VENIPUNCTURE: CPT | Mod: PO

## 2020-10-20 PROCEDURE — 82642 DIHYDROTESTOSTERONE: CPT

## 2020-10-20 PROCEDURE — 82088 ASSAY OF ALDOSTERONE: CPT

## 2020-10-20 PROCEDURE — 99999 PR PBB SHADOW E&M-EST. PATIENT-LVL IV: CPT | Mod: PBBFAC,,, | Performed by: INTERNAL MEDICINE

## 2020-10-20 PROCEDURE — 3008F BODY MASS INDEX DOCD: CPT | Mod: CPTII,S$GLB,, | Performed by: INTERNAL MEDICINE

## 2020-10-20 PROCEDURE — 99999 PR PBB SHADOW E&M-EST. PATIENT-LVL IV: ICD-10-PCS | Mod: PBBFAC,,, | Performed by: INTERNAL MEDICINE

## 2020-10-20 PROCEDURE — 84244 ASSAY OF RENIN: CPT

## 2020-10-20 PROCEDURE — 99204 OFFICE O/P NEW MOD 45 MIN: CPT | Mod: S$GLB,,, | Performed by: INTERNAL MEDICINE

## 2020-10-20 PROCEDURE — 3008F PR BODY MASS INDEX (BMI) DOCUMENTED: ICD-10-PCS | Mod: CPTII,S$GLB,, | Performed by: INTERNAL MEDICINE

## 2020-10-20 PROCEDURE — 82670 ASSAY OF TOTAL ESTRADIOL: CPT

## 2020-10-20 PROCEDURE — 82626 DEHYDROEPIANDROSTERONE: CPT

## 2020-10-20 PROCEDURE — 83036 HEMOGLOBIN GLYCOSYLATED A1C: CPT

## 2020-10-20 PROCEDURE — 82040 ASSAY OF SERUM ALBUMIN: CPT

## 2020-10-20 PROCEDURE — 84144 ASSAY OF PROGESTERONE: CPT

## 2020-10-20 PROCEDURE — 99204 PR OFFICE/OUTPT VISIT, NEW, LEVL IV, 45-59 MIN: ICD-10-PCS | Mod: S$GLB,,, | Performed by: INTERNAL MEDICINE

## 2020-10-20 PROCEDURE — 83001 ASSAY OF GONADOTROPIN (FSH): CPT

## 2020-10-20 PROCEDURE — 82672 ASSAY OF ESTROGEN: CPT

## 2020-10-20 PROCEDURE — 83498 ASY HYDROXYPROGESTERONE 17-D: CPT

## 2020-10-20 PROCEDURE — 82627 DEHYDROEPIANDROSTERONE: CPT

## 2020-10-20 PROCEDURE — 83002 ASSAY OF GONADOTROPIN (LH): CPT

## 2020-10-20 RX ORDER — METFORMIN HYDROCHLORIDE 1000 MG/1
1000 TABLET ORAL 2 TIMES DAILY WITH MEALS
Qty: 180 TABLET | Refills: 3 | Status: SHIPPED | OUTPATIENT
Start: 2020-10-20 | End: 2022-04-11 | Stop reason: SDUPTHER

## 2020-10-20 NOTE — LETTER
October 20, 2020      Julius Jeffers Jr., MD  [address]  [facility] [address]           [place] [facility]  [address] [facility] [address]  [facility] [address]  Phone: [phone]          Patient: Meagan Bran   MR Number: [id number]   YOB: 1980   Date of Visit: 10/20/2020       Dear Dr. Julius Jeffers Jr.:    Thank you for referring Meagan Bran to me for evaluation. Attached you will find relevant portions of my assessment and plan of care.    If you have questions, please do not hesitate to call me. I look forward to following Meagan Bran along with you.    Sincerely,    Rhys Hidalgo MD    Enclosure  CC:  No Recipients    If you would like to receive this communication electronically, please contact [email or web address] or [phone] to request more information on [email or web address] Link access.    For providers and/or their staff who would like to refer a patient to [facility], please contact us through our one-stop-shop provider referral line, [facility], at [phone].    If you feel you have received this communication in error or would no longer like to receive these types of communications, please e-mail [email or web address]

## 2020-10-20 NOTE — PROGRESS NOTES
Subjective:      Patient ID: Meagan Bran is a 40 y.o. female.    Chief Complaint:     Patient is a 40 yr old lady seen in up today but was last seen in the clinic ~ 4 yrs ago.  She is a 40 yr old lady with hypothyroidism, PCOS and morbid obesity seen in [facility] today.    History of Present Illness    The patient, Ms Bran is a 40 yr old lady seen for initial care visit today on account of hypothyroidism presumably due to autoimmune thyroiditis for which she is on thyroid hormone repletion; LT4 75mcg Qd. She in addition has OSAS does not use a CPAP mask presently (difficulty tolerating same), hypovitaminosis D, morbid obesity and ?? Depression on treatment.  Recent labs done @ [facility] show elevated total testosterone; 59ng/ml (8-48), TSH of 3.37 and LH/FSH of 6.4/3.7 with normal prolactin; 9.3, elevated insulin levels; 49 and DHEAs; 209.3 which is wnl. These labs were from 01/16.  Her prior pelvic USS from 2011 was essentially normal with no evidence of cystic ovarian disease noted. Overall findings are consistent with PCOS with hyperandrogenism. She is presently on low dose metformin (500mg TWICE DAILY) and Lt4 75mcg DAILY.  Her screening thyroid USs from 07/16 showed sub cm thyroid nodular disease but one of the nodules had some calcifications noted (exact morphology not stated). She is thus to have a ffup of the thyroid USS done for ~ jan 2017.  Patients last period was 10/14 and this was associated with menorrhagia and last ~ 7 days. She is yet to have her November period.  She has some mental fogginess and fatigue which are chronic.  Her baseline Proctor score is 8.The patient has apparently been of metformin ( she apparently never started taking it) and levothyroxine by her self decision for several months now.  She has gained an additional 30+ lbs since the last time she was seen in the system from 06/18.  She has been ammenorrhiec for ~ 10 months now. She has also noted increase in her hirsustism since  her last visit. Patients father had bariatic surgery.       Review of Systems   Constitutional: Positive for unexpected weight change (interval weight gain). Negative for activity change, appetite change, chills, diaphoresis and fatigue.   HENT: Negative for facial swelling, hearing loss, nosebleeds, sinus pressure, sore throat, trouble swallowing and voice change.    Eyes: Negative for photophobia and visual disturbance.   Respiratory: Negative for cough and shortness of breath.    Cardiovascular: Negative for chest pain, palpitations and leg swelling.   Gastrointestinal: Negative for abdominal distention, abdominal pain, constipation, diarrhea, nausea and vomiting.   Endocrine: Negative for cold intolerance, heat intolerance, polydipsia, polyphagia and polyuria.   Genitourinary: Positive for menstrual problem (amenorrheic). Negative for difficulty urinating, dysuria, flank pain, frequency, hematuria and urgency.   Musculoskeletal: Negative for arthralgias, back pain, gait problem, joint swelling, myalgias, neck pain and neck stiffness.   Skin: Negative for color change and pallor.   Neurological: Negative for dizziness, tremors, seizures, syncope, weakness, light-headedness, numbness and headaches.   Hematological: Does not bruise/bleed easily.   Psychiatric/Behavioral: Negative for agitation, confusion, dysphoric mood, hallucinations and sleep disturbance. The patient is not nervous/anxious.        Objective: BP (!) 140/82 (BP Location: Right arm, Patient Position: Sitting, BP Method: Large (Manual))   Pulse 83   Temp 97.7 °F (36.5 °C) (Temporal)   Ht 5' (1.524 m)   Wt (!) 151.9 kg (334 lb 15.8 oz)   BMI 65.42 kg/m²  Body surface area is 2.54 meters squared.         Physical Exam  Vitals signs and nursing note reviewed.   Constitutional:       General: She is not in acute distress.     Appearance: She is well-developed. She is obese. She is not ill-appearing or diaphoretic.      Comments: Pleasant young  lady. Obese. Not pale, anicteric and afebrile. Well hydrated. Not in any acute distress.   HENT:      Head: Normocephalic and atraumatic. Not macrocephalic. No right periorbital erythema or left periorbital erythema. Hair is normal.        Comments: Nuchal AN.     Nose: Nose normal.      Mouth/Throat:      Mouth: Mucous membranes are not pale and not dry.      Pharynx: No oropharyngeal exudate.   Eyes:      General: Lids are normal. No scleral icterus.        Right eye: No discharge.         Left eye: No discharge.      Conjunctiva/sclera: Conjunctivae normal.      Pupils: Pupils are equal, round, and reactive to light.   Neck:      Musculoskeletal: Normal range of motion.      Thyroid: No thyromegaly.      Vascular: Normal carotid pulses. No carotid bruit or JVD.      Trachea: No tracheal deviation.        Comments: Nuchal AN with multiple small skin tags in the neck and upper chest area.  Cardiovascular:      Rate and Rhythm: Normal rate and regular rhythm.      Chest Wall: PMI is not displaced.      Pulses: Normal pulses.      Heart sounds: Normal heart sounds, S1 normal and S2 normal. No murmur. No gallop.    Pulmonary:      Effort: Pulmonary effort is normal. No respiratory distress.      Breath sounds: Normal breath sounds. No wheezing or rales.   Abdominal:      General: Bowel sounds are normal. There is no distension.      Palpations: Abdomen is soft. There is no hepatomegaly, splenomegaly or mass.      Tenderness: There is no abdominal tenderness. There is no guarding or rebound.      Hernia: No hernia is present. There is no hernia in the ventral area.      Comments: Obese anterior abdominal wall.   Musculoskeletal:         General: No swelling or tenderness.      Right shoulder: She exhibits normal range of motion, no bony tenderness, no crepitus, no deformity, no pain, no spasm, normal pulse and normal strength.      Comments: No pedal edema nor calf swelling or tenderness.   Lymphadenopathy:       Cervical: No cervical adenopathy.   Skin:     General: Skin is warm and dry.      Coloration: Skin is not jaundiced or pale.      Findings: No bruising, ecchymosis, erythema, petechiae or rash.      Nails: There is no clubbing.     Neurological:      Mental Status: She is alert and oriented to person, place, and time.      Cranial Nerves: Cranial nerves are intact. No cranial nerve deficit.      Sensory: Sensation is intact. No sensory deficit.      Motor: Motor function is intact. No tremor, atrophy or abnormal muscle tone.      Coordination: Coordination is intact. Coordination normal.      Gait: Gait normal.      Deep Tendon Reflexes: Reflexes are normal and symmetric. Reflexes normal.   Psychiatric:         Behavior: Behavior normal. Behavior is cooperative.         Thought Content: Thought content normal.         Judgment: Judgment normal.         Lab Review:   No recent available labs for review from the [facility] data repository.    Assessment:     1. Acquired hypothyroidism  T4, Free    T3    TSH    Uric Acid    CBC auto differential    Lipid Panel   2. Nodular thyroid disease  US Soft Tissue Head Neck Thyroid    T4, Free    T3    Thyroglobulin    TSH    Chromogranin A    Calcitonin    Iodine, Serum   3. Hashimoto's thyroiditis  Thyroglobulin   4. Goiter     5. Dysmetabolic syndrome  Comprehensive Metabolic Panel    CBC auto differential    Lipid Panel    Hemoglobin A1C    Microalbumin/Creatinine Ratio, Urine    Urinalysis    Nursing communication    Nursing communication   6. Hypovitaminosis D  Vitamin D    CBC auto differential   7. Hyperinsulinemia     8. Hyperandrogenism  Testosterone Panel    DHEA-Sulfate    DHEA    Dihydrotestosterone    17-Hydroxyprogesterone    Follicle Stimulating Hormone    Estrogens, Total    Estradiol    Prolactin    Progesterone    Luteinizing Hormone   9. Polycystic ovarian syndrome  Comprehensive Metabolic Panel    Lipid Panel    Hemoglobin A1C    Testosterone Panel     DHEA-Sulfate    DHEA    Dihydrotestosterone    17-Hydroxyprogesterone    Follicle Stimulating Hormone    Estrogens, Total    Estradiol    Prolactin    Progesterone    Luteinizing Hormone   10. Other depression     11. Anxiety     12. Thyroiditis     13. Obstructive sleep apnea  Aldosterone    Renin   14. Insulin resistance     15. Morbid obesity  Nursing communication    Nursing communication   16. Abnormal thyroid blood test     17. Hirsutism          Regarding hypothyroidism; Patient appears clinically euthyroid. To recheckTFTs today but based on results will need to restart LT4 repletion therapy.  Regarding thyroid nodular disease; To also obtain ffup thyroid USS to evaluate current status of previously noted thyroid nodular disease.  2. Regarding hypovitaminosis D; to continue OTC vitamin d repletion and will recheck 25 OH vit d levels to guide need for any dose adjustment.  3. Regarding morbid obesity; Dicussed with patient basic strategies for effective weight management and referred to dietician for indepth calorie deficit counseling. Provided the patient literature to assist with weight loss deficit plans.  4. Regarding insulin resistance and dysmetabolic syndrome; to restart low dose metformin; 500mg BID and check basic secreening labs to evaluate current glycemic status.  5. Regarding hirsutism and PCOS; Discussed possible management options with the patient who wants to hold of on commencing OCPs at the present time.  6. Regarding OSAS; encouraged to uses CPAP as much as possible.   7. Regarding depression; to continue lexapro as before.    Plan:     FFup in ~ 4mths.

## 2020-10-20 NOTE — PROGRESS NOTES
I have reviewed the results of the patients recent body composition testing. The full results are available for viewing in the media tab section.  Her estimated BMR is 2193 Kcal/day  Based on the Chan [place] equation.  Her estimated DILLON is ~ 2741.25.  Her % body fat is 52.5% (norm should be 21-33%).  Her suggested daily dietary caloric intake goal to enable a significant calorie deficit to enable sustained weight loss is ~ 2000kcal/day.  Will await results of actually measured RMR for comparison. To inform patient.

## 2020-10-24 ENCOUNTER — HOSPITAL ENCOUNTER (OUTPATIENT)
Dept: RADIOLOGY | Facility: HOSPITAL | Age: 40
Discharge: HOME | End: 2020-10-24
Attending: INTERNAL MEDICINE
Payer: COMMERCIAL

## 2020-10-24 DIAGNOSIS — E04.1 NODULAR THYROID DISEASE: ICD-10-CM

## 2020-10-24 LAB — 17OHP SERPL-MCNC: 171 NG/DL (ref 35–413)

## 2020-10-24 PROCEDURE — 76536 US EXAM OF HEAD AND NECK: CPT | Mod: TC

## 2020-10-24 PROCEDURE — 76536 US SOFT TISSUE HEAD NECK THYROID: ICD-10-PCS | Mod: 26,,, | Performed by: RADIOLOGY

## 2020-10-24 PROCEDURE — 76536 US EXAM OF HEAD AND NECK: CPT | Mod: 26,,, | Performed by: RADIOLOGY

## 2020-10-25 LAB — DHEA SERPL-MCNC: 4.64 NG/ML (ref 1.33–7.78)

## 2020-10-26 LAB
ALDOST SERPL-MCNC: 11.6 NG/DL
ANDROSTANOLONE SERPL-MCNC: 123 PG/ML
ESTROGEN SERPL-MCNC: 353 PG/ML
RENIN PLAS-CCNC: 5.7 NG/ML/H

## 2020-10-30 DIAGNOSIS — L68.0 HIRSUTISM: Primary | ICD-10-CM

## 2020-10-30 DIAGNOSIS — E28.8 HYPERANDROGENISM: ICD-10-CM

## 2020-10-30 DIAGNOSIS — E28.2 POLYCYSTIC OVARIAN SYNDROME: ICD-10-CM

## 2020-10-30 LAB
ALBUMIN SERPL-MCNC: 4.1 G/DL (ref 3.6–5.1)
SHBG SERPL-SCNC: 27 NMOL/L (ref 17–124)
TESTOST FREE SERPL-MCNC: 15.7 PG/ML (ref 0.2–5)
TESTOST SERPL-MCNC: 110 NG/DL (ref 2–45)
TESTOSTERONE.FREE+WB SERPL-MCNC: 29.5 NG/DL (ref 0.5–8.5)

## 2020-10-31 ENCOUNTER — LAB VISIT (OUTPATIENT)
Dept: LAB | Facility: HOSPITAL | Age: 40
End: 2020-10-31
Attending: INTERNAL MEDICINE
Payer: COMMERCIAL

## 2020-10-31 DIAGNOSIS — E88.810 DYSMETABOLIC SYNDROME: ICD-10-CM

## 2020-10-31 DIAGNOSIS — E55.9 HYPOVITAMINOSIS D: ICD-10-CM

## 2020-10-31 DIAGNOSIS — E04.1 NODULAR THYROID DISEASE: ICD-10-CM

## 2020-10-31 DIAGNOSIS — E28.2 POLYCYSTIC OVARIAN SYNDROME: ICD-10-CM

## 2020-10-31 DIAGNOSIS — E03.9 ACQUIRED HYPOTHYROIDISM: ICD-10-CM

## 2020-10-31 DIAGNOSIS — E06.3 HASHIMOTO'S THYROIDITIS: ICD-10-CM

## 2020-10-31 LAB
25(OH)D3+25(OH)D2 SERPL-MCNC: 32 NG/ML (ref 30–96)
ALBUMIN SERPL BCP-MCNC: 3.8 G/DL (ref 3.5–5.2)
ALP SERPL-CCNC: 65 U/L (ref 55–135)
ALT SERPL W/O P-5'-P-CCNC: 44 U/L (ref 10–44)
ANION GAP SERPL CALC-SCNC: 9 MMOL/L (ref 8–16)
AST SERPL-CCNC: 31 U/L (ref 10–40)
BASOPHILS # BLD AUTO: 0.03 K/UL (ref 0–0.2)
BASOPHILS NFR BLD: 0.7 % (ref 0–1.9)
BILIRUB SERPL-MCNC: 0.3 MG/DL (ref 0.1–1)
BUN SERPL-MCNC: 12 MG/DL (ref 6–20)
CALCIUM SERPL-MCNC: 9 MG/DL (ref 8.7–10.5)
CHLORIDE SERPL-SCNC: 101 MMOL/L (ref 95–110)
CHOLEST SERPL-MCNC: 169 MG/DL (ref 120–199)
CHOLEST/HDLC SERPL: 4.6 {RATIO} (ref 2–5)
CO2 SERPL-SCNC: 24 MMOL/L (ref 23–29)
CREAT SERPL-MCNC: 0.7 MG/DL (ref 0.5–1.4)
DIFFERENTIAL METHOD: ABNORMAL
EOSINOPHIL # BLD AUTO: 0.2 K/UL (ref 0–0.5)
EOSINOPHIL NFR BLD: 3.6 % (ref 0–8)
ERYTHROCYTE [DISTWIDTH] IN BLOOD BY AUTOMATED COUNT: 12.8 % (ref 11.5–14.5)
EST. GFR  (AFRICAN AMERICAN): >60 ML/MIN/1.73 M^2
EST. GFR  (NON AFRICAN AMERICAN): >60 ML/MIN/1.73 M^2
GLUCOSE SERPL-MCNC: 101 MG/DL (ref 70–110)
HCT VFR BLD AUTO: 42.1 % (ref 37–48.5)
HDLC SERPL-MCNC: 37 MG/DL (ref 40–75)
HDLC SERPL: 21.9 % (ref 20–50)
HGB BLD-MCNC: 13.4 G/DL (ref 12–16)
IMM GRANULOCYTES # BLD AUTO: 0.01 K/UL (ref 0–0.04)
IMM GRANULOCYTES NFR BLD AUTO: 0.2 % (ref 0–0.5)
LDLC SERPL CALC-MCNC: 109.2 MG/DL (ref 63–159)
LYMPHOCYTES # BLD AUTO: 1.6 K/UL (ref 1–4.8)
LYMPHOCYTES NFR BLD: 35.5 % (ref 18–48)
MCH RBC QN AUTO: 28.6 PG (ref 27–31)
MCHC RBC AUTO-ENTMCNC: 31.8 G/DL (ref 32–36)
MCV RBC AUTO: 90 FL (ref 82–98)
MONOCYTES # BLD AUTO: 0.4 K/UL (ref 0.3–1)
MONOCYTES NFR BLD: 9.8 % (ref 4–15)
NEUTROPHILS # BLD AUTO: 2.2 K/UL (ref 1.8–7.7)
NEUTROPHILS NFR BLD: 50.2 % (ref 38–73)
NONHDLC SERPL-MCNC: 132 MG/DL
NRBC BLD-RTO: 0 /100 WBC
PLATELET # BLD AUTO: 300 K/UL (ref 150–350)
PMV BLD AUTO: 10.1 FL (ref 9.2–12.9)
POTASSIUM SERPL-SCNC: 4 MMOL/L (ref 3.5–5.1)
PROT SERPL-MCNC: 7.3 G/DL (ref 6–8.4)
RBC # BLD AUTO: 4.69 M/UL (ref 4–5.4)
SODIUM SERPL-SCNC: 134 MMOL/L (ref 136–145)
T3 SERPL-MCNC: 113 NG/DL (ref 60–180)
T4 FREE SERPL-MCNC: 0.79 NG/DL (ref 0.71–1.51)
TRIGL SERPL-MCNC: 114 MG/DL (ref 30–150)
TSH SERPL DL<=0.005 MIU/L-ACNC: 4.69 UIU/ML (ref 0.4–4)
URATE SERPL-MCNC: 6.3 MG/DL (ref 2.4–5.7)
WBC # BLD AUTO: 4.4 K/UL (ref 3.9–12.7)

## 2020-10-31 PROCEDURE — 84432 ASSAY OF THYROGLOBULIN: CPT

## 2020-10-31 PROCEDURE — 85025 COMPLETE CBC W/AUTO DIFF WBC: CPT

## 2020-10-31 PROCEDURE — 83018 HEAVY METAL QUAN EACH NES: CPT

## 2020-10-31 PROCEDURE — 86316 IMMUNOASSAY TUMOR OTHER: CPT

## 2020-10-31 PROCEDURE — 82308 ASSAY OF CALCITONIN: CPT

## 2020-10-31 PROCEDURE — 80053 COMPREHEN METABOLIC PANEL: CPT

## 2020-10-31 PROCEDURE — 80061 LIPID PANEL: CPT

## 2020-10-31 PROCEDURE — 82306 VITAMIN D 25 HYDROXY: CPT

## 2020-10-31 PROCEDURE — 36415 COLL VENOUS BLD VENIPUNCTURE: CPT | Mod: PO

## 2020-10-31 PROCEDURE — 84550 ASSAY OF BLOOD/URIC ACID: CPT

## 2020-10-31 PROCEDURE — 84443 ASSAY THYROID STIM HORMONE: CPT

## 2020-10-31 PROCEDURE — 84439 ASSAY OF FREE THYROXINE: CPT

## 2020-10-31 PROCEDURE — 84480 ASSAY TRIIODOTHYRONINE (T3): CPT

## 2020-10-31 RX ORDER — LEVOTHYROXINE SODIUM 75 UG/1
75 TABLET ORAL DAILY
Qty: 90 TABLET | Refills: 3 | Status: SHIPPED | OUTPATIENT
Start: 2020-10-31 | End: 2021-04-22 | Stop reason: SDUPTHER

## 2020-11-03 LAB
THRYOGLOBULIN INTERPRETATION: ABNORMAL
THYROGLOB AB SERPL-ACNC: <1.8 IU/ML
THYROGLOB SERPL-MCNC: 14 NG/ML

## 2020-11-04 LAB — IODINE SERPL-MCNC: 49 NG/ML (ref 40–92)

## 2020-11-05 LAB
CALCIT SERPL-MCNC: <5 PG/ML
CGA SERPL-MCNC: 23 NG/ML (ref 0–103)

## 2020-11-07 ENCOUNTER — DOCUMENTATION ONLY (OUTPATIENT)
Dept: ENDOCRINOLOGY | Facility: CLINIC | Age: 40
End: 2020-11-07

## 2020-11-07 NOTE — PROGRESS NOTES
Patient's measured RMR from 11/3/2020 is 2060kcal/day. This is quite close and comparable to the estimated BMR (2193 Kcal/day)  from her prior body composition studies and the Chan [place] equation computations.  This suggests an estimated DILLON of ~ 2575 kcal /day.   Her daily caloric intake thus based on this to achieve a significant caloric deficit to enable sustained weight loss would be ~ 7156-3284 kcal/day.

## 2020-11-17 ENCOUNTER — HOSPITAL ENCOUNTER (OUTPATIENT)
Dept: RADIOLOGY | Facility: HOSPITAL | Age: 40
Discharge: HOME | End: 2020-11-17
Attending: INTERNAL MEDICINE
Payer: COMMERCIAL

## 2020-11-17 DIAGNOSIS — N83.8 OVARIAN ENLARGEMENT, LEFT: ICD-10-CM

## 2020-11-17 DIAGNOSIS — E28.8 HYPERANDROGENISM: ICD-10-CM

## 2020-11-17 DIAGNOSIS — N83.8 OVARIAN ENLARGEMENT, RIGHT: ICD-10-CM

## 2020-11-17 DIAGNOSIS — L68.0 HIRSUTISM: ICD-10-CM

## 2020-11-17 DIAGNOSIS — E28.2 POLYCYSTIC OVARIAN SYNDROME: ICD-10-CM

## 2020-11-17 PROCEDURE — 76856 US EXAM PELVIC COMPLETE: CPT | Mod: TC

## 2020-11-17 PROCEDURE — 76856 US PELVIS COMPLETE NON OB: ICD-10-PCS | Mod: 26,,, | Performed by: RADIOLOGY

## 2020-11-17 PROCEDURE — 76856 US EXAM PELVIC COMPLETE: CPT | Mod: 26,,, | Performed by: RADIOLOGY

## 2020-11-21 ENCOUNTER — HOSPITAL ENCOUNTER (OUTPATIENT)
Dept: RADIOLOGY | Facility: HOSPITAL | Age: 40
Discharge: HOME | End: 2020-11-21
Attending: INTERNAL MEDICINE
Payer: COMMERCIAL

## 2020-11-21 DIAGNOSIS — K76.0 NAFLD (NONALCOHOLIC FATTY LIVER DISEASE): ICD-10-CM

## 2020-11-21 DIAGNOSIS — L68.0 HIRSUTISM: ICD-10-CM

## 2020-11-21 DIAGNOSIS — E28.8 HYPERANDROGENISM: Primary | ICD-10-CM

## 2020-11-21 DIAGNOSIS — E28.8 HYPERANDROGENISM: ICD-10-CM

## 2020-11-21 PROCEDURE — 74170 CT ABD WO CNTRST FLWD CNTRST: CPT | Mod: 26,,, | Performed by: RADIOLOGY

## 2020-11-21 PROCEDURE — 74170 CT ABDOMEN W WO CONTRAST: ICD-10-PCS | Mod: 26,,, | Performed by: RADIOLOGY

## 2020-11-21 PROCEDURE — 74170 CT ABD WO CNTRST FLWD CNTRST: CPT | Mod: TC

## 2020-11-21 PROCEDURE — 25500020 PHARM REV CODE 255: Performed by: INTERNAL MEDICINE

## 2020-11-21 RX ORDER — DEXAMETHASONE 1 MG/1
1 TABLET ORAL EVERY 12 HOURS
Qty: 1 TABLET | Refills: 0 | Status: SHIPPED | OUTPATIENT
Start: 2020-11-21 | End: 2021-03-22 | Stop reason: ALTCHOICE

## 2020-11-21 RX ADMIN — IOHEXOL 100 ML: 350 INJECTION, SOLUTION INTRAVENOUS at 11:11

## 2020-11-22 ENCOUNTER — PATIENT MESSAGE (OUTPATIENT)
Dept: ENDOCRINOLOGY | Facility: CLINIC | Age: 40
End: 2020-11-22

## 2020-11-23 ENCOUNTER — TELEPHONE (OUTPATIENT)
Dept: ENDOCRINOLOGY | Facility: CLINIC | Age: 40
End: 2020-11-23

## 2020-11-23 NOTE — TELEPHONE ENCOUNTER
Spoke with pharmacist and clarified rx for Decadron.  Pharmacist verbalized understanding.      ----- Message from Bernie Flores sent at 11/23/2020  9:43 AM CST -----  Type:  Pharmacy Calling to Clarify an RX    Name of Caller:  Pharmacy   Pharmacy Name:    [id number] - [place] - [address]  [address] [place] [address]  Phone: [phone] Fax: [phone]  Prescription Name:  dexAMETHasone (DECADRON) 1 MG Tab  What do they need to clarify?:  Directions VS Quantity  Additional Information:  please advise-thank you

## 2020-12-01 RX ORDER — ALBUTEROL SULFATE 90 UG/1
2 AEROSOL, METERED RESPIRATORY (INHALATION) EVERY 6 HOURS PRN
Qty: 18 G | Refills: 10 | Status: CANCELLED | OUTPATIENT
Start: 2020-12-01

## 2020-12-02 NOTE — TELEPHONE ENCOUNTER
Patient has not been seen in over a year. LVM for patient to return phone call to set up appointment for refill and annual appointment.

## 2020-12-03 ENCOUNTER — PATIENT MESSAGE (OUTPATIENT)
Dept: ENDOCRINOLOGY | Facility: CLINIC | Age: 40
End: 2020-12-03

## 2020-12-08 ENCOUNTER — OFFICE VISIT (OUTPATIENT)
Dept: FAMILY MEDICINE | Facility: CLINIC | Age: 40
End: 2020-12-08
Payer: COMMERCIAL

## 2020-12-08 VITALS
WEIGHT: 293 LBS | TEMPERATURE: 98 F | DIASTOLIC BLOOD PRESSURE: 76 MMHG | OXYGEN SATURATION: 97 % | HEIGHT: 60 IN | BODY MASS INDEX: 57.52 KG/M2 | SYSTOLIC BLOOD PRESSURE: 122 MMHG | HEART RATE: 84 BPM

## 2020-12-08 DIAGNOSIS — J45.20 MILD INTERMITTENT ASTHMA WITHOUT COMPLICATION: ICD-10-CM

## 2020-12-08 PROCEDURE — 3008F PR BODY MASS INDEX (BMI) DOCUMENTED: ICD-10-PCS | Mod: CPTII,S$GLB,, | Performed by: PHYSICIAN ASSISTANT

## 2020-12-08 PROCEDURE — 1126F AMNT PAIN NOTED NONE PRSNT: CPT | Mod: S$GLB,,, | Performed by: PHYSICIAN ASSISTANT

## 2020-12-08 PROCEDURE — 99999 PR PBB SHADOW E&M-EST. PATIENT-LVL III: ICD-10-PCS | Mod: PBBFAC,,, | Performed by: PHYSICIAN ASSISTANT

## 2020-12-08 PROCEDURE — 3008F BODY MASS INDEX DOCD: CPT | Mod: CPTII,S$GLB,, | Performed by: PHYSICIAN ASSISTANT

## 2020-12-08 PROCEDURE — 99213 PR OFFICE/OUTPT VISIT, EST, LEVL III, 20-29 MIN: ICD-10-PCS | Mod: S$GLB,,, | Performed by: PHYSICIAN ASSISTANT

## 2020-12-08 PROCEDURE — 1126F PR PAIN SEVERITY QUANTIFIED, NO PAIN PRESENT: ICD-10-PCS | Mod: S$GLB,,, | Performed by: PHYSICIAN ASSISTANT

## 2020-12-08 PROCEDURE — 99999 PR PBB SHADOW E&M-EST. PATIENT-LVL III: CPT | Mod: PBBFAC,,, | Performed by: PHYSICIAN ASSISTANT

## 2020-12-08 PROCEDURE — 99213 OFFICE O/P EST LOW 20 MIN: CPT | Mod: S$GLB,,, | Performed by: PHYSICIAN ASSISTANT

## 2020-12-08 RX ORDER — ALBUTEROL SULFATE 1.25 MG/3ML
1.25 SOLUTION RESPIRATORY (INHALATION) EVERY 6 HOURS PRN
Qty: 72 ML | Refills: 11 | Status: SHIPPED | OUTPATIENT
Start: 2020-12-08 | End: 2022-06-20 | Stop reason: SDUPTHER

## 2020-12-08 RX ORDER — ALBUTEROL SULFATE 90 UG/1
2 AEROSOL, METERED RESPIRATORY (INHALATION) EVERY 6 HOURS PRN
Qty: 18 G | Refills: 10 | Status: SHIPPED | OUTPATIENT
Start: 2020-12-08 | End: 2022-06-02

## 2020-12-08 RX ORDER — FLUTICASONE PROPIONATE AND SALMETEROL 500; 50 UG/1; UG/1
1 POWDER RESPIRATORY (INHALATION) 2 TIMES DAILY
Qty: 60 EACH | Refills: 11 | Status: SHIPPED | OUTPATIENT
Start: 2020-12-08

## 2020-12-08 RX ORDER — BUDESONIDE 0.5 MG/2ML
0.5 INHALANT ORAL DAILY
Qty: 100 ML | Refills: 11 | Status: SHIPPED | OUTPATIENT
Start: 2020-12-08 | End: 2022-06-20 | Stop reason: SDUPTHER

## 2020-12-08 NOTE — PROGRESS NOTES
Subjective:       Patient ID: Meagan Bran is a 40 y.o. female.    Chief Complaint: Medication Refill    HPI  Review of Systems    Objective:      Physical Exam    Assessment:       1. Mild intermittent asthma without complication        Plan:       Meagan was seen today for medication refill.    Diagnoses and all orders for this visit:    Mild intermittent asthma without complication  -     albuterol (ACCUNEB) 1.25 mg/3 mL Nebu; Take 3 mLs (1.25 mg total) by nebulization every 6 (six) hours as needed.  -     budesonide (PULMICORT) 0.5 mg/2 mL nebulizer solution; Take 2 mLs (0.5 mg total) by nebulization once daily.  -     fluticasone-salmeterol diskus inhaler 500-50 mcg; Inhale 1 puff into the lungs 2 (two) times daily.    -     albuterol (VENTOLIN HFA) 90 mcg/actuation inhaler; Inhale 2 puffs into the lungs every 6 (six) hours as needed. Rescue           No follow-ups on file.

## 2020-12-08 NOTE — PROGRESS NOTES
Subjective:       Patient ID: Meagan Bran is a 40 y.o. female.    Chief Complaint: Medication Refill    Meagan Bran is a 39 yo female with asthma, anxiety, hypothyroidism, obesity, NAFLD, and VANNESA who presents for follow up. She is followed by Dr. Hidalgo, endocrinology. The patient is requesting refills on her inhalers and nebulizers. These work well for her. She has not seen pulmonology in a few years and states she normally just has them refilled here. The patient was evaluated by bariatrics, Dr. Drummond, in June. She is attempting lifestyle modifications prior to pursing any surgical intervention. She has been trying to walk at work and eat a healthier diet. Her metformin sometimes causes nausea which will cause her to go off of her diet plan. She does not report any other issues today. She is due for a pap smear, for which she sees Dr. Odom. She denies the flu shot or pneumovax at this time.    Review of Systems   Constitutional: Negative for chills, fatigue and fever.   HENT: Negative for nasal congestion, rhinorrhea and sinus pressure/congestion.    Eyes: Negative for discharge and visual disturbance.   Respiratory: Negative for cough and shortness of breath.    Cardiovascular: Negative for chest pain, palpitations and leg swelling.   Gastrointestinal: Negative for abdominal pain, constipation and diarrhea.   Genitourinary: Negative for bladder incontinence, frequency and urgency.   Neurological: Negative for dizziness, numbness and headaches.   Psychiatric/Behavioral: The patient is not nervous/anxious.          Objective:      Physical Exam  Vitals signs reviewed.   Constitutional:       General: She is not in acute distress.     Appearance: She is obese. She is not toxic-appearing.   HENT:      Head: Normocephalic and atraumatic.   Eyes:      Extraocular Movements: Extraocular movements intact.      Conjunctiva/sclera: Conjunctivae normal.      Pupils: Pupils are equal, round, and reactive to light.    Neck:      Musculoskeletal: Normal range of motion.   Cardiovascular:      Rate and Rhythm: Normal rate and regular rhythm.      Pulses: Normal pulses.      Heart sounds: Normal heart sounds.   Pulmonary:      Effort: Pulmonary effort is normal.      Breath sounds: Normal breath sounds.   Abdominal:      General: Bowel sounds are normal.      Palpations: Abdomen is soft.      Tenderness: There is no abdominal tenderness.   Musculoskeletal: Normal range of motion.      Right lower leg: No edema.      Left lower leg: No edema.   Lymphadenopathy:      Cervical: No cervical adenopathy.   Skin:     General: Skin is warm.      Findings: No erythema.   Neurological:      Mental Status: She is alert and oriented to person, place, and time.   Psychiatric:         Mood and Affect: Mood normal.         Behavior: Behavior normal.         Assessment:       1. Mild intermittent asthma without complication        Plan:       Meagan was seen today for medication refill.    Diagnoses and all orders for this visit:    Mild intermittent asthma without complication  -     albuterol (ACCUNEB) 1.25 mg/3 mL Nebu; Take 3 mLs (1.25 mg total) by nebulization every 6 (six) hours as needed.  -     budesonide (PULMICORT) 0.5 mg/2 mL nebulizer solution; Take 2 mLs (0.5 mg total) by nebulization once daily.  -     fluticasone-salmeterol diskus inhaler 500-50 mcg; Inhale 1 puff into the lungs 2 (two) times daily.    Other orders  -     Cancel: Hepatitis C Antibody; Future  -     albuterol (VENTOLIN HFA) 90 mcg/actuation inhaler; Inhale 2 puffs into the lungs every 6 (six) hours as needed. Rescue

## 2020-12-09 ENCOUNTER — TELEPHONE (OUTPATIENT)
Dept: ENDOCRINOLOGY | Facility: CLINIC | Age: 40
End: 2020-12-09

## 2020-12-09 NOTE — TELEPHONE ENCOUNTER
----- Message from Rhys Hidalgo MD sent at 11/21/2020  5:01 PM CST -----  Regarding: Dex suppression test  I have put in orders for the patient to have an overnight dexamethasone suppression test. Kindly confirm with the patient that she has received and picked up the single dose of dexamethasone for the test and then assist her with scheduling the day she will take the pill late at night and schedule her blood draw the day after before 9am. The orders for the blood draw are already in the system.  Thanks    Rhys Hidalgo MD

## 2020-12-13 ENCOUNTER — PATIENT MESSAGE (OUTPATIENT)
Dept: ENDOCRINOLOGY | Facility: CLINIC | Age: 40
End: 2020-12-13

## 2020-12-14 ENCOUNTER — PATIENT MESSAGE (OUTPATIENT)
Dept: ENDOCRINOLOGY | Facility: CLINIC | Age: 40
End: 2020-12-14

## 2020-12-14 ENCOUNTER — TELEPHONE (OUTPATIENT)
Dept: ENDOCRINOLOGY | Facility: CLINIC | Age: 40
End: 2020-12-14

## 2020-12-15 ENCOUNTER — LAB VISIT (OUTPATIENT)
Dept: LAB | Facility: HOSPITAL | Age: 40
End: 2020-12-15
Attending: INTERNAL MEDICINE
Payer: COMMERCIAL

## 2020-12-15 DIAGNOSIS — E28.8 HYPERANDROGENISM: ICD-10-CM

## 2020-12-15 LAB — CORTIS SERPL-MCNC: <1 UG/DL (ref 4.3–22.4)

## 2020-12-15 PROCEDURE — 82024 ASSAY OF ACTH: CPT

## 2020-12-15 PROCEDURE — 82040 ASSAY OF SERUM ALBUMIN: CPT

## 2020-12-15 PROCEDURE — 82088 ASSAY OF ALDOSTERONE: CPT

## 2020-12-15 PROCEDURE — 36415 COLL VENOUS BLD VENIPUNCTURE: CPT

## 2020-12-15 PROCEDURE — 82533 TOTAL CORTISOL: CPT

## 2020-12-15 PROCEDURE — 82542 COL CHROMOTOGRAPHY QUAL/QUAN: CPT

## 2020-12-18 LAB
ACTH PLAS-MCNC: <5 PG/ML (ref 0–46)
ALDOST SERPL-MCNC: 6.8 NG/DL

## 2020-12-21 DIAGNOSIS — L68.0 HIRSUTISM: ICD-10-CM

## 2020-12-21 DIAGNOSIS — E28.2 POLYCYSTIC OVARIAN SYNDROME: ICD-10-CM

## 2020-12-21 DIAGNOSIS — N83.8 OVARIAN ENLARGEMENT, LEFT: ICD-10-CM

## 2020-12-21 DIAGNOSIS — E28.8 HYPERANDROGENISM: Primary | ICD-10-CM

## 2020-12-21 DIAGNOSIS — N83.8 OVARIAN ENLARGEMENT, RIGHT: ICD-10-CM

## 2020-12-21 LAB
ALBUMIN SERPL-MCNC: 4.2 G/DL (ref 3.6–5.1)
SHBG SERPL-SCNC: 28 NMOL/L (ref 17–124)
TESTOST FREE SERPL-MCNC: 11.5 PG/ML (ref 0.2–5)
TESTOST SERPL-MCNC: 84 NG/DL (ref 2–45)
TESTOSTERONE.FREE+WB SERPL-MCNC: 22.1 NG/DL (ref 0.5–8.5)

## 2020-12-28 LAB — DEXAMETHASONE SERPL-MCNC: NORMAL NG/DL

## 2021-03-22 ENCOUNTER — OFFICE VISIT (OUTPATIENT)
Dept: FAMILY MEDICINE | Facility: CLINIC | Age: 41
End: 2021-03-22
Payer: COMMERCIAL

## 2021-03-22 VITALS
HEART RATE: 76 BPM | HEIGHT: 60 IN | OXYGEN SATURATION: 98 % | TEMPERATURE: 97 F | SYSTOLIC BLOOD PRESSURE: 122 MMHG | BODY MASS INDEX: 57.52 KG/M2 | WEIGHT: 293 LBS | DIASTOLIC BLOOD PRESSURE: 60 MMHG

## 2021-03-22 DIAGNOSIS — J45.20 MILD INTERMITTENT ASTHMA WITHOUT COMPLICATION: ICD-10-CM

## 2021-03-22 DIAGNOSIS — E03.9 ACQUIRED HYPOTHYROIDISM: Primary | ICD-10-CM

## 2021-03-22 DIAGNOSIS — K76.0 NAFLD (NONALCOHOLIC FATTY LIVER DISEASE): ICD-10-CM

## 2021-03-22 DIAGNOSIS — Z11.59 ENCOUNTER FOR HEPATITIS C SCREENING TEST FOR LOW RISK PATIENT: ICD-10-CM

## 2021-03-22 DIAGNOSIS — E88.810 DYSMETABOLIC SYNDROME: ICD-10-CM

## 2021-03-22 DIAGNOSIS — F32.89 OTHER DEPRESSION: ICD-10-CM

## 2021-03-22 DIAGNOSIS — E28.2 POLYCYSTIC OVARIAN SYNDROME: ICD-10-CM

## 2021-03-22 DIAGNOSIS — F41.9 ANXIETY: ICD-10-CM

## 2021-03-22 DIAGNOSIS — E66.01 MORBID OBESITY WITH BMI OF 60.0-69.9, ADULT: ICD-10-CM

## 2021-03-22 PROCEDURE — 99999 PR PBB SHADOW E&M-EST. PATIENT-LVL III: ICD-10-PCS | Mod: PBBFAC,,, | Performed by: FAMILY MEDICINE

## 2021-03-22 PROCEDURE — 99214 OFFICE O/P EST MOD 30 MIN: CPT | Mod: S$GLB,,, | Performed by: FAMILY MEDICINE

## 2021-03-22 PROCEDURE — 1126F PR PAIN SEVERITY QUANTIFIED, NO PAIN PRESENT: ICD-10-PCS | Mod: S$GLB,,, | Performed by: FAMILY MEDICINE

## 2021-03-22 PROCEDURE — 99214 PR OFFICE/OUTPT VISIT, EST, LEVL IV, 30-39 MIN: ICD-10-PCS | Mod: S$GLB,,, | Performed by: FAMILY MEDICINE

## 2021-03-22 PROCEDURE — 1126F AMNT PAIN NOTED NONE PRSNT: CPT | Mod: S$GLB,,, | Performed by: FAMILY MEDICINE

## 2021-03-22 PROCEDURE — 3008F PR BODY MASS INDEX (BMI) DOCUMENTED: ICD-10-PCS | Mod: CPTII,S$GLB,, | Performed by: FAMILY MEDICINE

## 2021-03-22 PROCEDURE — 3008F BODY MASS INDEX DOCD: CPT | Mod: CPTII,S$GLB,, | Performed by: FAMILY MEDICINE

## 2021-03-22 PROCEDURE — 99999 PR PBB SHADOW E&M-EST. PATIENT-LVL III: CPT | Mod: PBBFAC,,, | Performed by: FAMILY MEDICINE

## 2021-04-15 ENCOUNTER — PATIENT OUTREACH (OUTPATIENT)
Dept: ADMINISTRATIVE | Facility: OTHER | Age: 41
End: 2021-04-15

## 2021-04-16 ENCOUNTER — OFFICE VISIT (OUTPATIENT)
Dept: ENDOCRINOLOGY | Facility: CLINIC | Age: 41
End: 2021-04-16
Payer: COMMERCIAL

## 2021-04-16 VITALS
SYSTOLIC BLOOD PRESSURE: 130 MMHG | DIASTOLIC BLOOD PRESSURE: 90 MMHG | BODY MASS INDEX: 57.52 KG/M2 | HEART RATE: 71 BPM | TEMPERATURE: 98 F | OXYGEN SATURATION: 97 % | HEIGHT: 60 IN | WEIGHT: 293 LBS

## 2021-04-16 DIAGNOSIS — E04.1 NODULAR THYROID DISEASE: ICD-10-CM

## 2021-04-16 DIAGNOSIS — E06.3 HASHIMOTO'S THYROIDITIS: ICD-10-CM

## 2021-04-16 DIAGNOSIS — E88.810 DYSMETABOLIC SYNDROME: ICD-10-CM

## 2021-04-16 DIAGNOSIS — L68.0 HIRSUTISM: ICD-10-CM

## 2021-04-16 DIAGNOSIS — N83.202 BILATERAL OVARIAN CYSTS: ICD-10-CM

## 2021-04-16 DIAGNOSIS — E28.8 HYPERANDROGENISM: ICD-10-CM

## 2021-04-16 DIAGNOSIS — E06.9 THYROIDITIS: ICD-10-CM

## 2021-04-16 DIAGNOSIS — E28.2 POLYCYSTIC OVARIAN SYNDROME: ICD-10-CM

## 2021-04-16 DIAGNOSIS — E55.9 HYPOVITAMINOSIS D: ICD-10-CM

## 2021-04-16 DIAGNOSIS — E03.9 ACQUIRED HYPOTHYROIDISM: Primary | ICD-10-CM

## 2021-04-16 DIAGNOSIS — K76.0 NAFLD (NONALCOHOLIC FATTY LIVER DISEASE): ICD-10-CM

## 2021-04-16 DIAGNOSIS — E66.01 MORBID OBESITY: ICD-10-CM

## 2021-04-16 DIAGNOSIS — N83.201 BILATERAL OVARIAN CYSTS: ICD-10-CM

## 2021-04-16 DIAGNOSIS — N83.8 OVARIAN ENLARGEMENT, LEFT: ICD-10-CM

## 2021-04-16 DIAGNOSIS — F41.9 ANXIETY: ICD-10-CM

## 2021-04-16 DIAGNOSIS — F32.89 OTHER DEPRESSION: ICD-10-CM

## 2021-04-16 DIAGNOSIS — G47.33 OBSTRUCTIVE SLEEP APNEA: ICD-10-CM

## 2021-04-16 DIAGNOSIS — E88.819 INSULIN RESISTANCE: ICD-10-CM

## 2021-04-16 PROCEDURE — 99999 PR PBB SHADOW E&M-EST. PATIENT-LVL IV: ICD-10-PCS | Mod: PBBFAC,,, | Performed by: INTERNAL MEDICINE

## 2021-04-16 PROCEDURE — 3008F BODY MASS INDEX DOCD: CPT | Mod: CPTII,S$GLB,, | Performed by: INTERNAL MEDICINE

## 2021-04-16 PROCEDURE — 1126F AMNT PAIN NOTED NONE PRSNT: CPT | Mod: S$GLB,,, | Performed by: INTERNAL MEDICINE

## 2021-04-16 PROCEDURE — 1126F PR PAIN SEVERITY QUANTIFIED, NO PAIN PRESENT: ICD-10-PCS | Mod: S$GLB,,, | Performed by: INTERNAL MEDICINE

## 2021-04-16 PROCEDURE — 99215 PR OFFICE/OUTPT VISIT, EST, LEVL V, 40-54 MIN: ICD-10-PCS | Mod: S$GLB,,, | Performed by: INTERNAL MEDICINE

## 2021-04-16 PROCEDURE — 99215 OFFICE O/P EST HI 40 MIN: CPT | Mod: S$GLB,,, | Performed by: INTERNAL MEDICINE

## 2021-04-16 PROCEDURE — 99999 PR PBB SHADOW E&M-EST. PATIENT-LVL IV: CPT | Mod: PBBFAC,,, | Performed by: INTERNAL MEDICINE

## 2021-04-16 PROCEDURE — 3008F PR BODY MASS INDEX (BMI) DOCUMENTED: ICD-10-PCS | Mod: CPTII,S$GLB,, | Performed by: INTERNAL MEDICINE

## 2021-04-21 ENCOUNTER — LAB VISIT (OUTPATIENT)
Dept: LAB | Facility: HOSPITAL | Age: 41
End: 2021-04-21
Attending: INTERNAL MEDICINE
Payer: COMMERCIAL

## 2021-04-21 DIAGNOSIS — L68.0 HIRSUTISM: ICD-10-CM

## 2021-04-21 DIAGNOSIS — N83.8 OVARIAN ENLARGEMENT, LEFT: ICD-10-CM

## 2021-04-21 DIAGNOSIS — E66.01 MORBID OBESITY: ICD-10-CM

## 2021-04-21 DIAGNOSIS — E06.9 THYROIDITIS: ICD-10-CM

## 2021-04-21 DIAGNOSIS — E04.1 NODULAR THYROID DISEASE: ICD-10-CM

## 2021-04-21 DIAGNOSIS — E03.9 ACQUIRED HYPOTHYROIDISM: ICD-10-CM

## 2021-04-21 DIAGNOSIS — N83.201 BILATERAL OVARIAN CYSTS: ICD-10-CM

## 2021-04-21 DIAGNOSIS — E88.810 DYSMETABOLIC SYNDROME: ICD-10-CM

## 2021-04-21 DIAGNOSIS — E28.2 POLYCYSTIC OVARIAN SYNDROME: ICD-10-CM

## 2021-04-21 DIAGNOSIS — E28.8 HYPERANDROGENISM: ICD-10-CM

## 2021-04-21 DIAGNOSIS — G47.33 OBSTRUCTIVE SLEEP APNEA: ICD-10-CM

## 2021-04-21 DIAGNOSIS — K76.0 NAFLD (NONALCOHOLIC FATTY LIVER DISEASE): ICD-10-CM

## 2021-04-21 DIAGNOSIS — N83.202 BILATERAL OVARIAN CYSTS: ICD-10-CM

## 2021-04-21 DIAGNOSIS — E55.9 HYPOVITAMINOSIS D: ICD-10-CM

## 2021-04-21 LAB
ALBUMIN SERPL BCP-MCNC: 3.8 G/DL (ref 3.5–5.2)
ALP SERPL-CCNC: 65 U/L (ref 55–135)
ALT SERPL W/O P-5'-P-CCNC: 29 U/L (ref 10–44)
AMMONIA PLAS-SCNC: 40 UMOL/L (ref 10–50)
ANION GAP SERPL CALC-SCNC: 8 MMOL/L (ref 8–16)
AST SERPL-CCNC: 17 U/L (ref 10–40)
BASOPHILS # BLD AUTO: 0.05 K/UL (ref 0–0.2)
BASOPHILS NFR BLD: 0.8 % (ref 0–1.9)
BILIRUB SERPL-MCNC: 0.3 MG/DL (ref 0.1–1)
BUN SERPL-MCNC: 13 MG/DL (ref 6–20)
CA-I BLDV-SCNC: 1.26 MMOL/L (ref 1.06–1.42)
CALCIUM SERPL-MCNC: 9.5 MG/DL (ref 8.7–10.5)
CHLORIDE SERPL-SCNC: 102 MMOL/L (ref 95–110)
CO2 SERPL-SCNC: 28 MMOL/L (ref 23–29)
CREAT SERPL-MCNC: 0.7 MG/DL (ref 0.5–1.4)
DIFFERENTIAL METHOD: NORMAL
EOSINOPHIL # BLD AUTO: 0.2 K/UL (ref 0–0.5)
EOSINOPHIL NFR BLD: 3 % (ref 0–8)
ERYTHROCYTE [DISTWIDTH] IN BLOOD BY AUTOMATED COUNT: 13 % (ref 11.5–14.5)
EST. GFR  (AFRICAN AMERICAN): >60 ML/MIN/1.73 M^2
EST. GFR  (NON AFRICAN AMERICAN): >60 ML/MIN/1.73 M^2
GGT SERPL-CCNC: 27 U/L (ref 8–55)
GLUCOSE SERPL-MCNC: 101 MG/DL (ref 70–110)
HCG INTACT+B SERPL-ACNC: <1.2 MIU/ML
HCT VFR BLD AUTO: 40.2 % (ref 37–48.5)
HGB BLD-MCNC: 13.1 G/DL (ref 12–16)
IMM GRANULOCYTES # BLD AUTO: 0.02 K/UL (ref 0–0.04)
IMM GRANULOCYTES NFR BLD AUTO: 0.3 % (ref 0–0.5)
LACTATE SERPL-SCNC: 0.8 MMOL/L (ref 0.5–2.2)
LYMPHOCYTES # BLD AUTO: 2 K/UL (ref 1–4.8)
LYMPHOCYTES NFR BLD: 30.9 % (ref 18–48)
MCH RBC QN AUTO: 28.9 PG (ref 27–31)
MCHC RBC AUTO-ENTMCNC: 32.6 G/DL (ref 32–36)
MCV RBC AUTO: 89 FL (ref 82–98)
MONOCYTES # BLD AUTO: 0.6 K/UL (ref 0.3–1)
MONOCYTES NFR BLD: 9.2 % (ref 4–15)
NEUTROPHILS # BLD AUTO: 3.6 K/UL (ref 1.8–7.7)
NEUTROPHILS NFR BLD: 55.8 % (ref 38–73)
NRBC BLD-RTO: 0 /100 WBC
PLATELET # BLD AUTO: 278 K/UL (ref 150–450)
PMV BLD AUTO: 9.9 FL (ref 9.2–12.9)
POTASSIUM SERPL-SCNC: 3.9 MMOL/L (ref 3.5–5.1)
PROT SERPL-MCNC: 7.4 G/DL (ref 6–8.4)
PTH-INTACT SERPL-MCNC: 86.9 PG/ML (ref 9–77)
RBC # BLD AUTO: 4.53 M/UL (ref 4–5.4)
SODIUM SERPL-SCNC: 138 MMOL/L (ref 136–145)
T4 FREE SERPL-MCNC: 0.87 NG/DL (ref 0.71–1.51)
TSH SERPL DL<=0.005 MIU/L-ACNC: 2.21 UIU/ML (ref 0.4–4)
URATE SERPL-MCNC: 5.2 MG/DL (ref 2.4–5.7)
WBC # BLD AUTO: 6.44 K/UL (ref 3.9–12.7)

## 2021-04-21 PROCEDURE — 83498 ASY HYDROXYPROGESTERONE 17-D: CPT | Performed by: INTERNAL MEDICINE

## 2021-04-21 PROCEDURE — 84702 CHORIONIC GONADOTROPIN TEST: CPT | Performed by: INTERNAL MEDICINE

## 2021-04-21 PROCEDURE — 82977 ASSAY OF GGT: CPT | Performed by: INTERNAL MEDICINE

## 2021-04-21 PROCEDURE — 82330 ASSAY OF CALCIUM: CPT | Performed by: INTERNAL MEDICINE

## 2021-04-21 PROCEDURE — 83002 ASSAY OF GONADOTROPIN (LH): CPT | Performed by: INTERNAL MEDICINE

## 2021-04-21 PROCEDURE — 82672 ASSAY OF ESTROGEN: CPT | Performed by: INTERNAL MEDICINE

## 2021-04-21 PROCEDURE — 82157 ASSAY OF ANDROSTENEDIONE: CPT | Performed by: INTERNAL MEDICINE

## 2021-04-21 PROCEDURE — 84403 ASSAY OF TOTAL TESTOSTERONE: CPT | Performed by: INTERNAL MEDICINE

## 2021-04-21 PROCEDURE — 82088 ASSAY OF ALDOSTERONE: CPT | Performed by: INTERNAL MEDICINE

## 2021-04-21 PROCEDURE — 83018 HEAVY METAL QUAN EACH NES: CPT | Performed by: INTERNAL MEDICINE

## 2021-04-21 PROCEDURE — 82306 VITAMIN D 25 HYDROXY: CPT | Performed by: INTERNAL MEDICINE

## 2021-04-21 PROCEDURE — 80053 COMPREHEN METABOLIC PANEL: CPT | Performed by: INTERNAL MEDICINE

## 2021-04-21 PROCEDURE — 82670 ASSAY OF TOTAL ESTRADIOL: CPT | Performed by: INTERNAL MEDICINE

## 2021-04-21 PROCEDURE — 82626 DEHYDROEPIANDROSTERONE: CPT | Performed by: INTERNAL MEDICINE

## 2021-04-21 PROCEDURE — 86800 THYROGLOBULIN ANTIBODY: CPT | Performed by: INTERNAL MEDICINE

## 2021-04-21 PROCEDURE — 83970 ASSAY OF PARATHORMONE: CPT | Performed by: INTERNAL MEDICINE

## 2021-04-21 PROCEDURE — 84439 ASSAY OF FREE THYROXINE: CPT | Performed by: INTERNAL MEDICINE

## 2021-04-21 PROCEDURE — 83605 ASSAY OF LACTIC ACID: CPT | Performed by: INTERNAL MEDICINE

## 2021-04-21 PROCEDURE — 82671 ASSAY OF ESTROGENS: CPT | Performed by: INTERNAL MEDICINE

## 2021-04-21 PROCEDURE — 82308 ASSAY OF CALCITONIN: CPT | Performed by: INTERNAL MEDICINE

## 2021-04-21 PROCEDURE — 84550 ASSAY OF BLOOD/URIC ACID: CPT | Performed by: INTERNAL MEDICINE

## 2021-04-21 PROCEDURE — 84144 ASSAY OF PROGESTERONE: CPT | Performed by: INTERNAL MEDICINE

## 2021-04-21 PROCEDURE — 84146 ASSAY OF PROLACTIN: CPT | Performed by: INTERNAL MEDICINE

## 2021-04-21 PROCEDURE — 84480 ASSAY TRIIODOTHYRONINE (T3): CPT | Performed by: INTERNAL MEDICINE

## 2021-04-21 PROCEDURE — 86316 IMMUNOASSAY TUMOR OTHER: CPT | Performed by: INTERNAL MEDICINE

## 2021-04-21 PROCEDURE — 85025 COMPLETE CBC W/AUTO DIFF WBC: CPT | Performed by: INTERNAL MEDICINE

## 2021-04-21 PROCEDURE — 82677 ASSAY OF ESTRIOL: CPT | Performed by: INTERNAL MEDICINE

## 2021-04-21 PROCEDURE — 82679 ASSAY OF ESTRONE: CPT | Performed by: INTERNAL MEDICINE

## 2021-04-21 PROCEDURE — 82140 ASSAY OF AMMONIA: CPT | Performed by: INTERNAL MEDICINE

## 2021-04-21 PROCEDURE — 84244 ASSAY OF RENIN: CPT | Performed by: INTERNAL MEDICINE

## 2021-04-21 PROCEDURE — 83001 ASSAY OF GONADOTROPIN (FSH): CPT | Performed by: INTERNAL MEDICINE

## 2021-04-21 PROCEDURE — 84443 ASSAY THYROID STIM HORMONE: CPT | Performed by: INTERNAL MEDICINE

## 2021-04-21 PROCEDURE — 82105 ALPHA-FETOPROTEIN SERUM: CPT | Performed by: INTERNAL MEDICINE

## 2021-04-21 PROCEDURE — 82642 DIHYDROTESTOSTERONE: CPT | Performed by: INTERNAL MEDICINE

## 2021-04-22 ENCOUNTER — HOSPITAL ENCOUNTER (OUTPATIENT)
Dept: RADIOLOGY | Facility: HOSPITAL | Age: 41
Discharge: HOME | End: 2021-04-22
Attending: INTERNAL MEDICINE
Payer: COMMERCIAL

## 2021-04-22 DIAGNOSIS — E28.2 POLYCYSTIC OVARIAN SYNDROME: ICD-10-CM

## 2021-04-22 DIAGNOSIS — N83.202 BILATERAL OVARIAN CYSTS: ICD-10-CM

## 2021-04-22 DIAGNOSIS — E03.9 ACQUIRED HYPOTHYROIDISM: ICD-10-CM

## 2021-04-22 DIAGNOSIS — N83.201 BILATERAL OVARIAN CYSTS: ICD-10-CM

## 2021-04-22 LAB
25(OH)D3+25(OH)D2 SERPL-MCNC: 25 NG/ML (ref 30–96)
AFP SERPL-MCNC: 2.1 NG/ML (ref 0–8.4)
ESTRADIOL SERPL-MCNC: 40 PG/ML
FSH SERPL-ACNC: 5.2 MIU/ML
LH SERPL-ACNC: 5 MIU/ML
PROGEST SERPL-MCNC: 0.1 NG/ML
PROLACTIN SERPL IA-MCNC: 8.6 NG/ML (ref 5.2–26.5)
T3 SERPL-MCNC: 110 NG/DL (ref 60–180)

## 2021-04-22 PROCEDURE — 76856 US EXAM PELVIC COMPLETE: CPT | Mod: TC

## 2021-04-22 PROCEDURE — 76856 US PELVIS COMPLETE NON OB: ICD-10-PCS | Mod: 26,,, | Performed by: RADIOLOGY

## 2021-04-22 PROCEDURE — 76856 US EXAM PELVIC COMPLETE: CPT | Mod: 26,,, | Performed by: RADIOLOGY

## 2021-04-22 RX ORDER — LEVOTHYROXINE SODIUM 75 UG/1
75 TABLET ORAL DAILY
Qty: 90 TABLET | Refills: 3 | Status: SHIPPED | OUTPATIENT
Start: 2021-04-22 | End: 2023-05-12

## 2021-04-23 LAB
CALCIT SERPL-MCNC: <5 PG/ML
CGA SERPL-MCNC: 27 NG/ML
IODINE SERPL-MCNC: 56 NG/ML (ref 40–92)
THRYOGLOBULIN INTERPRETATION: ABNORMAL
THYROGLOB AB SERPL-ACNC: <1.8 IU/ML
THYROGLOB SERPL-MCNC: 6.1 NG/ML
U ESTRIOL SERPL-MCNC: <0.07 NG/ML

## 2021-04-26 LAB
17OHP SERPL-MCNC: 98 NG/DL (ref 35–413)
ALDOST SERPL-MCNC: 8 NG/DL
ANDROSTANOLONE SERPL-MCNC: 95 PG/ML
ESTROGEN SERPL-MCNC: 275 PG/ML
RENIN PLAS-CCNC: 1.5 NG/ML/H

## 2021-04-27 LAB
ALBUMIN SERPL-MCNC: 4.1 G/DL (ref 3.6–5.1)
ANDROST SERPL-MCNC: 150 NG/DL
DHEA SERPL-MCNC: 2.15 NG/ML (ref 1.33–7.78)
SHBG SERPL-SCNC: 20 NMOL/L (ref 17–124)
TESTOST FREE SERPL-MCNC: 14.4 PG/ML (ref 0.2–5)
TESTOST SERPL-MCNC: 82 NG/DL (ref 2–45)
TESTOSTERONE.FREE+WB SERPL-MCNC: 27.1 NG/DL (ref 0.5–8.5)

## 2021-05-18 LAB
ESTRADIOL SERPL-MCNC: 53 PG/ML
ESTRONE SERPL-MCNC: 109 PG/ML

## 2021-07-22 DIAGNOSIS — F41.9 ANXIETY: ICD-10-CM

## 2021-07-22 RX ORDER — ESCITALOPRAM OXALATE 10 MG/1
10 TABLET ORAL DAILY
Qty: 30 TABLET | Refills: 11 | Status: SHIPPED | OUTPATIENT
Start: 2021-07-22 | End: 2022-06-20 | Stop reason: SDUPTHER

## 2021-09-09 ENCOUNTER — TELEPHONE (OUTPATIENT)
Dept: ENDOCRINOLOGY | Facility: CLINIC | Age: 41
End: 2021-09-09
Payer: COMMERCIAL

## 2021-09-20 NOTE — TELEPHONE ENCOUNTER
Pt is requesting medication refill on  budesonide (PULMICORT) 0.5 mg/2 mL nebulizer solution   albuterol (ACCUNEB) 1.25 mg/3 mL Nebu   Last visit: 4/5/18  Last refill: albuterol 9/10/15  pulmicort 2/16/17  Follow Up: 11/1/18     Yes

## 2021-10-05 ENCOUNTER — PATIENT OUTREACH (OUTPATIENT)
Dept: ADMINISTRATIVE | Facility: HOSPITAL | Age: 41
End: 2021-10-05

## 2021-11-15 ENCOUNTER — PATIENT MESSAGE (OUTPATIENT)
Dept: ENDOCRINOLOGY | Facility: CLINIC | Age: 41
End: 2021-11-15
Payer: COMMERCIAL

## 2021-11-15 ENCOUNTER — PATIENT OUTREACH (OUTPATIENT)
Dept: ADMINISTRATIVE | Facility: HOSPITAL | Age: 41
End: 2021-11-15
Payer: COMMERCIAL

## 2021-11-15 DIAGNOSIS — R73.09 DYSGLYCEMIA: ICD-10-CM

## 2021-11-15 DIAGNOSIS — E66.01 MORBID OBESITY: ICD-10-CM

## 2021-11-15 DIAGNOSIS — R73.03 PREDIABETES: Primary | ICD-10-CM

## 2021-11-15 DIAGNOSIS — K76.0 NAFLD (NONALCOHOLIC FATTY LIVER DISEASE): ICD-10-CM

## 2021-11-15 DIAGNOSIS — E28.2 POLYCYSTIC OVARIAN SYNDROME: ICD-10-CM

## 2021-11-16 ENCOUNTER — TELEPHONE (OUTPATIENT)
Dept: DIABETES | Facility: CLINIC | Age: 41
End: 2021-11-16
Payer: COMMERCIAL

## 2021-11-30 ENCOUNTER — TELEPHONE (OUTPATIENT)
Dept: ADMINISTRATIVE | Facility: HOSPITAL | Age: 41
End: 2021-11-30
Payer: COMMERCIAL

## 2021-12-01 ENCOUNTER — PATIENT OUTREACH (OUTPATIENT)
Dept: ADMINISTRATIVE | Facility: HOSPITAL | Age: 41
End: 2021-12-01
Payer: COMMERCIAL

## 2022-01-25 ENCOUNTER — TELEPHONE (OUTPATIENT)
Dept: ADMINISTRATIVE | Facility: HOSPITAL | Age: 42
End: 2022-01-25
Payer: COMMERCIAL

## 2022-04-11 ENCOUNTER — OFFICE VISIT (OUTPATIENT)
Dept: ENDOCRINOLOGY | Facility: CLINIC | Age: 42
End: 2022-04-11
Payer: COMMERCIAL

## 2022-04-11 ENCOUNTER — LAB VISIT (OUTPATIENT)
Dept: LAB | Facility: HOSPITAL | Age: 42
End: 2022-04-11
Attending: INTERNAL MEDICINE
Payer: COMMERCIAL

## 2022-04-11 VITALS
TEMPERATURE: 99 F | WEIGHT: 245.38 LBS | DIASTOLIC BLOOD PRESSURE: 70 MMHG | SYSTOLIC BLOOD PRESSURE: 118 MMHG | BODY MASS INDEX: 48.17 KG/M2 | HEIGHT: 60 IN | RESPIRATION RATE: 16 BRPM | OXYGEN SATURATION: 98 % | HEART RATE: 63 BPM

## 2022-04-11 DIAGNOSIS — R73.03 PREDIABETES: ICD-10-CM

## 2022-04-11 DIAGNOSIS — E88.810 DYSMETABOLIC SYNDROME: ICD-10-CM

## 2022-04-11 DIAGNOSIS — L68.0 HIRSUTISM: ICD-10-CM

## 2022-04-11 DIAGNOSIS — E55.9 HYPOVITAMINOSIS D: ICD-10-CM

## 2022-04-11 DIAGNOSIS — E03.9 ACQUIRED HYPOTHYROIDISM: ICD-10-CM

## 2022-04-11 DIAGNOSIS — G47.33 OBSTRUCTIVE SLEEP APNEA: ICD-10-CM

## 2022-04-11 DIAGNOSIS — E06.9 THYROIDITIS: ICD-10-CM

## 2022-04-11 DIAGNOSIS — R79.89 ABNORMAL THYROID BLOOD TEST: Primary | ICD-10-CM

## 2022-04-11 DIAGNOSIS — N83.8 OVARIAN ENLARGEMENT, RIGHT: ICD-10-CM

## 2022-04-11 DIAGNOSIS — E04.1 NODULAR THYROID DISEASE: ICD-10-CM

## 2022-04-11 DIAGNOSIS — R97.8 ABNORMAL TUMOR MARKERS: ICD-10-CM

## 2022-04-11 DIAGNOSIS — E04.9 GOITER: ICD-10-CM

## 2022-04-11 DIAGNOSIS — E28.2 POLYCYSTIC OVARIAN SYNDROME: ICD-10-CM

## 2022-04-11 DIAGNOSIS — R73.09 DYSGLYCEMIA: ICD-10-CM

## 2022-04-11 DIAGNOSIS — K76.0 NAFLD (NONALCOHOLIC FATTY LIVER DISEASE): ICD-10-CM

## 2022-04-11 DIAGNOSIS — F41.9 ANXIETY: ICD-10-CM

## 2022-04-11 DIAGNOSIS — E78.5 DYSLIPIDEMIA: ICD-10-CM

## 2022-04-11 DIAGNOSIS — N83.8 OVARIAN ENLARGEMENT, LEFT: ICD-10-CM

## 2022-04-11 DIAGNOSIS — F32.89 OTHER DEPRESSION: ICD-10-CM

## 2022-04-11 LAB
25(OH)D3+25(OH)D2 SERPL-MCNC: 36 NG/ML (ref 30–96)
ALBUMIN SERPL BCP-MCNC: 3.9 G/DL (ref 3.5–5.2)
ALP SERPL-CCNC: 56 U/L (ref 55–135)
ALT SERPL W/O P-5'-P-CCNC: 59 U/L (ref 10–44)
ANION GAP SERPL CALC-SCNC: 11 MMOL/L (ref 8–16)
AST SERPL-CCNC: 30 U/L (ref 10–40)
BASOPHILS # BLD AUTO: 0.04 K/UL (ref 0–0.2)
BASOPHILS NFR BLD: 0.6 % (ref 0–1.9)
BILIRUB SERPL-MCNC: 0.3 MG/DL (ref 0.1–1)
BUN SERPL-MCNC: 17 MG/DL (ref 6–20)
CA-I BLDV-SCNC: 1.27 MMOL/L (ref 1.06–1.42)
CALCIUM SERPL-MCNC: 10.3 MG/DL (ref 8.7–10.5)
CHLORIDE SERPL-SCNC: 104 MMOL/L (ref 95–110)
CHOLEST SERPL-MCNC: 200 MG/DL (ref 120–199)
CHOLEST/HDLC SERPL: 5.1 {RATIO} (ref 2–5)
CO2 SERPL-SCNC: 22 MMOL/L (ref 23–29)
CREAT SERPL-MCNC: 0.6 MG/DL (ref 0.5–1.4)
DIFFERENTIAL METHOD: NORMAL
EOSINOPHIL # BLD AUTO: 0.1 K/UL (ref 0–0.5)
EOSINOPHIL NFR BLD: 1.7 % (ref 0–8)
ERYTHROCYTE [DISTWIDTH] IN BLOOD BY AUTOMATED COUNT: 12.9 % (ref 11.5–14.5)
EST. GFR  (AFRICAN AMERICAN): >60 ML/MIN/1.73 M^2
EST. GFR  (NON AFRICAN AMERICAN): >60 ML/MIN/1.73 M^2
ESTIMATED AVG GLUCOSE: 97 MG/DL (ref 68–131)
GGT SERPL-CCNC: 49 U/L (ref 8–55)
GLUCOSE SERPL-MCNC: 86 MG/DL (ref 70–110)
HBA1C MFR BLD: 5 % (ref 4–5.6)
HCT VFR BLD AUTO: 42.7 % (ref 37–48.5)
HDLC SERPL-MCNC: 39 MG/DL (ref 40–75)
HDLC SERPL: 19.5 % (ref 20–50)
HGB BLD-MCNC: 13.7 G/DL (ref 12–16)
IMM GRANULOCYTES # BLD AUTO: 0.01 K/UL (ref 0–0.04)
IMM GRANULOCYTES NFR BLD AUTO: 0.2 % (ref 0–0.5)
LDLC SERPL CALC-MCNC: 147.6 MG/DL (ref 63–159)
LYMPHOCYTES # BLD AUTO: 1.9 K/UL (ref 1–4.8)
LYMPHOCYTES NFR BLD: 30 % (ref 18–48)
MCH RBC QN AUTO: 29.1 PG (ref 27–31)
MCHC RBC AUTO-ENTMCNC: 32.1 G/DL (ref 32–36)
MCV RBC AUTO: 91 FL (ref 82–98)
MONOCYTES # BLD AUTO: 0.5 K/UL (ref 0.3–1)
MONOCYTES NFR BLD: 8 % (ref 4–15)
NEUTROPHILS # BLD AUTO: 3.8 K/UL (ref 1.8–7.7)
NEUTROPHILS NFR BLD: 59.5 % (ref 38–73)
NONHDLC SERPL-MCNC: 161 MG/DL
NRBC BLD-RTO: 0 /100 WBC
PLATELET # BLD AUTO: 335 K/UL (ref 150–450)
PMV BLD AUTO: 11.1 FL (ref 9.2–12.9)
POTASSIUM SERPL-SCNC: 4.2 MMOL/L (ref 3.5–5.1)
PROT SERPL-MCNC: 7.5 G/DL (ref 6–8.4)
PTH-INTACT SERPL-MCNC: 49.7 PG/ML (ref 9–77)
RBC # BLD AUTO: 4.71 M/UL (ref 4–5.4)
SODIUM SERPL-SCNC: 137 MMOL/L (ref 136–145)
TRIGL SERPL-MCNC: 67 MG/DL (ref 30–150)
TSH SERPL DL<=0.005 MIU/L-ACNC: 3.78 UIU/ML (ref 0.4–4)
WBC # BLD AUTO: 6.34 K/UL (ref 3.9–12.7)

## 2022-04-11 PROCEDURE — 3008F PR BODY MASS INDEX (BMI) DOCUMENTED: ICD-10-PCS | Mod: CPTII,S$GLB,, | Performed by: INTERNAL MEDICINE

## 2022-04-11 PROCEDURE — 80053 COMPREHEN METABOLIC PANEL: CPT | Performed by: INTERNAL MEDICINE

## 2022-04-11 PROCEDURE — 82626 DEHYDROEPIANDROSTERONE: CPT | Performed by: INTERNAL MEDICINE

## 2022-04-11 PROCEDURE — 83970 ASSAY OF PARATHORMONE: CPT | Performed by: INTERNAL MEDICINE

## 2022-04-11 PROCEDURE — 84146 ASSAY OF PROLACTIN: CPT | Performed by: INTERNAL MEDICINE

## 2022-04-11 PROCEDURE — 83605 ASSAY OF LACTIC ACID: CPT | Performed by: INTERNAL MEDICINE

## 2022-04-11 PROCEDURE — 3074F SYST BP LT 130 MM HG: CPT | Mod: CPTII,S$GLB,, | Performed by: INTERNAL MEDICINE

## 2022-04-11 PROCEDURE — 82672 ASSAY OF ESTROGEN: CPT | Performed by: INTERNAL MEDICINE

## 2022-04-11 PROCEDURE — 99999 PR PBB SHADOW E&M-EST. PATIENT-LVL V: ICD-10-PCS | Mod: PBBFAC,,, | Performed by: INTERNAL MEDICINE

## 2022-04-11 PROCEDURE — 1160F RVW MEDS BY RX/DR IN RCRD: CPT | Mod: CPTII,S$GLB,, | Performed by: INTERNAL MEDICINE

## 2022-04-11 PROCEDURE — 84443 ASSAY THYROID STIM HORMONE: CPT | Performed by: INTERNAL MEDICINE

## 2022-04-11 PROCEDURE — 82088 ASSAY OF ALDOSTERONE: CPT | Performed by: INTERNAL MEDICINE

## 2022-04-11 PROCEDURE — 82627 DEHYDROEPIANDROSTERONE: CPT | Performed by: INTERNAL MEDICINE

## 2022-04-11 PROCEDURE — 82977 ASSAY OF GGT: CPT | Performed by: INTERNAL MEDICINE

## 2022-04-11 PROCEDURE — 82670 ASSAY OF TOTAL ESTRADIOL: CPT | Performed by: INTERNAL MEDICINE

## 2022-04-11 PROCEDURE — 83036 HEMOGLOBIN GLYCOSYLATED A1C: CPT | Performed by: INTERNAL MEDICINE

## 2022-04-11 PROCEDURE — 83001 ASSAY OF GONADOTROPIN (FSH): CPT | Performed by: INTERNAL MEDICINE

## 2022-04-11 PROCEDURE — 82150 ASSAY OF AMYLASE: CPT | Performed by: INTERNAL MEDICINE

## 2022-04-11 PROCEDURE — 82306 VITAMIN D 25 HYDROXY: CPT | Performed by: INTERNAL MEDICINE

## 2022-04-11 PROCEDURE — 99214 PR OFFICE/OUTPT VISIT, EST, LEVL IV, 30-39 MIN: ICD-10-PCS | Mod: S$GLB,,, | Performed by: INTERNAL MEDICINE

## 2022-04-11 PROCEDURE — 85025 COMPLETE CBC W/AUTO DIFF WBC: CPT | Performed by: INTERNAL MEDICINE

## 2022-04-11 PROCEDURE — 83002 ASSAY OF GONADOTROPIN (LH): CPT | Performed by: INTERNAL MEDICINE

## 2022-04-11 PROCEDURE — 82105 ALPHA-FETOPROTEIN SERUM: CPT | Performed by: INTERNAL MEDICINE

## 2022-04-11 PROCEDURE — 84550 ASSAY OF BLOOD/URIC ACID: CPT | Performed by: INTERNAL MEDICINE

## 2022-04-11 PROCEDURE — 3078F DIAST BP <80 MM HG: CPT | Mod: CPTII,S$GLB,, | Performed by: INTERNAL MEDICINE

## 2022-04-11 PROCEDURE — 3078F PR MOST RECENT DIASTOLIC BLOOD PRESSURE < 80 MM HG: ICD-10-PCS | Mod: CPTII,S$GLB,, | Performed by: INTERNAL MEDICINE

## 2022-04-11 PROCEDURE — 82140 ASSAY OF AMMONIA: CPT | Performed by: INTERNAL MEDICINE

## 2022-04-11 PROCEDURE — 3074F PR MOST RECENT SYSTOLIC BLOOD PRESSURE < 130 MM HG: ICD-10-PCS | Mod: CPTII,S$GLB,, | Performed by: INTERNAL MEDICINE

## 2022-04-11 PROCEDURE — 99214 OFFICE O/P EST MOD 30 MIN: CPT | Mod: S$GLB,,, | Performed by: INTERNAL MEDICINE

## 2022-04-11 PROCEDURE — 99999 PR PBB SHADOW E&M-EST. PATIENT-LVL V: CPT | Mod: PBBFAC,,, | Performed by: INTERNAL MEDICINE

## 2022-04-11 PROCEDURE — 1159F MED LIST DOCD IN RCRD: CPT | Mod: CPTII,S$GLB,, | Performed by: INTERNAL MEDICINE

## 2022-04-11 PROCEDURE — 82040 ASSAY OF SERUM ALBUMIN: CPT | Performed by: INTERNAL MEDICINE

## 2022-04-11 PROCEDURE — 80061 LIPID PANEL: CPT | Performed by: INTERNAL MEDICINE

## 2022-04-11 PROCEDURE — 84144 ASSAY OF PROGESTERONE: CPT | Performed by: INTERNAL MEDICINE

## 2022-04-11 PROCEDURE — 84244 ASSAY OF RENIN: CPT | Performed by: INTERNAL MEDICINE

## 2022-04-11 PROCEDURE — 3008F BODY MASS INDEX DOCD: CPT | Mod: CPTII,S$GLB,, | Performed by: INTERNAL MEDICINE

## 2022-04-11 PROCEDURE — 83690 ASSAY OF LIPASE: CPT | Performed by: INTERNAL MEDICINE

## 2022-04-11 PROCEDURE — 1159F PR MEDICATION LIST DOCUMENTED IN MEDICAL RECORD: ICD-10-PCS | Mod: CPTII,S$GLB,, | Performed by: INTERNAL MEDICINE

## 2022-04-11 PROCEDURE — 82330 ASSAY OF CALCIUM: CPT | Performed by: INTERNAL MEDICINE

## 2022-04-11 PROCEDURE — 1160F PR REVIEW ALL MEDS BY PRESCRIBER/CLIN PHARMACIST DOCUMENTED: ICD-10-PCS | Mod: CPTII,S$GLB,, | Performed by: INTERNAL MEDICINE

## 2022-04-11 PROCEDURE — 36415 COLL VENOUS BLD VENIPUNCTURE: CPT | Mod: PO | Performed by: INTERNAL MEDICINE

## 2022-04-11 PROCEDURE — 82157 ASSAY OF ANDROSTENEDIONE: CPT | Performed by: INTERNAL MEDICINE

## 2022-04-11 RX ORDER — METFORMIN HYDROCHLORIDE 1000 MG/1
1000 TABLET ORAL 2 TIMES DAILY WITH MEALS
Qty: 180 TABLET | Refills: 3 | Status: SHIPPED | OUTPATIENT
Start: 2022-04-11 | End: 2023-04-11

## 2022-04-11 NOTE — PROGRESS NOTES
Subjective:      Patient ID: Meagan Bran is a 41 y.o. female.    Chief Complaint:  Follow-up    Patient is a 41 yr old lady seen in up today  with hypothyroidism, PCOS/other ovarian hyperandrogenic syndrome and morbid obesity seen in ffup today.    History of Present Illness    The patient, Ms Bran is a 40 yr old lady seen in up today on account of hypothyroidism presumably due to autoimmune thyroiditis for which she is on thyroid hormone repletion; LT4 75mcg Qd. She in addition has OSAS does not use a CPAP mask presently (difficulty tolerating same), hypovitaminosis D, morbid obesity and ?? Depression on treatment.    Labs done @ [facility] show elevated total testosterone; 59ng/ml (8-48), TSH of 3.37 and LH/FSH of 6.4/3.7 with normal prolactin; 9.3, elevated insulin levels; 49 and DHEAs; 209.3 which is wnl. These labs were from 01/16.  Her prior pelvic USS from 2011 was essentially normal with no evidence of cystic ovarian disease noted. Overall findings are consistent with PCOS with hyperandrogenism. She is presently on low dose metformin (500mg TWICE DAILY) and Lt4 75mcg DAILY.  Her screening thyroid USs from 07/16 showed sub cm thyroid nodular disease but one of the nodules had some calcifications noted (exact morphology not stated). She is thus to have a ffup of the thyroid USS done for ~ jan 2017.  Patients last period was 10/14 and this was associated with menorrhagia and last ~ 7 days. She is yet to have her November period.  She has some mental fogginess and fatigue which are chronic.  Her baseline Star score is 8.The patient has apparently been of metformin ( she apparently never started taking it) and levothyroxine by her self decision for several months now.  She has gained an additional 30+ lbs since the last time she was seen in the system from 06/18.  She has been amenorrhiec for ~ 10 months now. She has also noted increase in her hirsustism since her last visit. Patients father had  bariatic surgery.   Patient has two sisters. Patients older sister apparently has PCOS and her younger sister had one miscarraige.     Patient's measured RMR from 11/3/2020 is 2060kcal/day. This is quite close and comparable to the estimated BMR (2193 Kcal/day)  from her prior body composition studies and the Chan Townsend equation computations.  This suggests an estimated DILLON of ~ 2575 kcal /day.   Her daily caloric intake thus based on this to achieve a significant caloric deficit to enable sustained weight loss would be ~ 5009-8878 kcal/day.  She has lost 83lbs over the last year. She has been on the keto diet since 09/22.  Since then her periods have become more consistent. So far this year she has 3 periods.   She admits that she has not been taking     Review of Systems   Constitutional: Positive for unexpected weight change (interval weight gain). Negative for activity change, appetite change, diaphoresis and fatigue.   HENT: Negative for facial swelling, hearing loss, trouble swallowing and voice change.    Eyes: Negative for photophobia and visual disturbance.   Respiratory: Negative for cough and shortness of breath.    Cardiovascular: Negative for chest pain, palpitations and leg swelling.   Gastrointestinal: Negative for abdominal distention, abdominal pain, constipation, diarrhea, nausea and vomiting.   Endocrine: Negative for cold intolerance, heat intolerance, polydipsia, polyphagia and polyuria.   Genitourinary: Positive for menstrual problem (amenorrheic). Negative for difficulty urinating, dysuria, flank pain, frequency, hematuria and urgency.   Musculoskeletal: Negative for arthralgias, back pain, gait problem, neck pain and neck stiffness.   Skin: Negative for color change, pallor and rash (Hirsutism which is gradually worsening.).   Neurological: Negative for dizziness, tremors, seizures, syncope, weakness, light-headedness, numbness and headaches.   Hematological: Does not bruise/bleed  easily.   Psychiatric/Behavioral: Negative for agitation, confusion, dysphoric mood, hallucinations and sleep disturbance. The patient is not nervous/anxious.        Objective:   /70 (BP Location: Right arm, Patient Position: Sitting, BP Method: Large (Manual))   Pulse 63   Temp 98.8 °F (37.1 °C) (Oral)   Resp 16   Ht 5' (1.524 m)   Wt 111.3 kg (245 lb 6 oz)   LMP 03/04/2022   SpO2 98%   BMI 47.92 kg/m²  Body surface area is 2.17 meters squared.         Physical Exam  Vitals and nursing note reviewed.   Constitutional:       General: She is not in acute distress.     Appearance: She is well-developed. She is obese. She is not ill-appearing or diaphoretic.      Comments: Pleasant young lady. Obese. Not pale, anicteric and afebrile. Well hydrated. Not in any acute distress.  Mildly hirsuite with associated hypertrichosis.   HENT:      Head: Normocephalic and atraumatic. Not macrocephalic. No right periorbital erythema or left periorbital erythema. Hair is normal.        Comments: Nuchal AN.     Nose: Nose normal.      Mouth/Throat:      Mouth: Mucous membranes are not pale and not dry.      Pharynx: No oropharyngeal exudate.   Eyes:      General: Lids are normal. No scleral icterus.        Right eye: No discharge.         Left eye: No discharge.      Conjunctiva/sclera: Conjunctivae normal.      Pupils: Pupils are equal, round, and reactive to light.   Neck:      Thyroid: No thyromegaly.      Vascular: Normal carotid pulses. No carotid bruit or JVD.      Trachea: No tracheal deviation.        Comments: Nuchal AN with multiple small skin tags in the neck and upper chest area.  Cardiovascular:      Rate and Rhythm: Normal rate and regular rhythm.      Chest Wall: PMI is not displaced.      Pulses: Normal pulses.      Heart sounds: Normal heart sounds, S1 normal and S2 normal. No murmur heard.    No gallop.   Pulmonary:      Effort: Pulmonary effort is normal. No respiratory distress.      Breath sounds:  Normal breath sounds. No stridor. No wheezing, rhonchi or rales.   Abdominal:      General: Bowel sounds are normal. There is no distension.      Palpations: Abdomen is soft. There is no hepatomegaly, splenomegaly or mass.      Tenderness: There is no abdominal tenderness. There is no guarding or rebound.      Hernia: No hernia is present. There is no hernia in the ventral area.      Comments: Obese anterior abdominal wall.   Musculoskeletal:         General: No swelling or tenderness.      Right shoulder: No deformity, bony tenderness or crepitus. Normal range of motion. Normal strength. Normal pulse.      Cervical back: Normal range of motion.      Comments: No pedal edema nor calf swelling or tenderness.   Lymphadenopathy:      Cervical: No cervical adenopathy.   Skin:     General: Skin is warm and dry.      Coloration: Skin is not jaundiced or pale.      Findings: No bruising, ecchymosis, erythema, petechiae or rash.      Nails: There is no clubbing.   Neurological:      General: No focal deficit present.      Mental Status: She is alert and oriented to person, place, and time.      Cranial Nerves: Cranial nerves are intact. No cranial nerve deficit.      Sensory: Sensation is intact. No sensory deficit.      Motor: Motor function is intact. No tremor, atrophy or abnormal muscle tone.      Coordination: Coordination is intact. Coordination normal.      Gait: Gait normal.      Deep Tendon Reflexes: Reflexes are normal and symmetric. Reflexes normal.   Psychiatric:         Behavior: Behavior normal. Behavior is cooperative.         Thought Content: Thought content normal.         Judgment: Judgment normal.         Lab Review:      Latest Reference Range & Units 04/21/21 16:48 04/22/21 18:05   WBC 3.90 - 12.70 K/uL 6.44    RBC 4.00 - 5.40 M/uL 4.53    Hemoglobin 12.0 - 16.0 g/dL 13.1    Hematocrit 37.0 - 48.5 % 40.2    MCV 82 - 98 fL 89    MCH 27.0 - 31.0 pg 28.9    MCHC 32.0 - 36.0 g/dL 32.6    RDW 11.5 - 14.5 %  13.0    Platelets 150 - 450 K/uL 278    MPV 9.2 - 12.9 fL 9.9    Gran % 38.0 - 73.0 % 55.8    Lymph % 18.0 - 48.0 % 30.9    Mono % 4.0 - 15.0 % 9.2    Eosinophil % 0.0 - 8.0 % 3.0    Basophil % 0.0 - 1.9 % 0.8    Immature Granulocytes 0.0 - 0.5 % 0.3    Gran # (ANC) 1.8 - 7.7 K/uL 3.6    Lymph # 1.0 - 4.8 K/uL 2.0    Mono # 0.3 - 1.0 K/uL 0.6    Eos # 0.0 - 0.5 K/uL 0.2    Baso # 0.00 - 0.20 K/uL 0.05    Immature Grans (Abs) 0.00 - 0.04 K/uL 0.02 [1]    NRBC 0 /100 WBC 0    Differential Method  Automated    Sodium 136 - 145 mmol/L 138    Potassium 3.5 - 5.1 mmol/L 3.9    Chloride 95 - 110 mmol/L 102    CO2 23 - 29 mmol/L 28    Anion Gap 8 - 16 mmol/L 8    BUN 6 - 20 mg/dL 13    Creatinine 0.5 - 1.4 mg/dL 0.7    EGFR if non African American >60 mL/min/1.73 m^2 >60 [2]    EGFR if African American >60 mL/min/1.73 m^2 >60    Glucose 70 - 110 mg/dL 101    Calcium 8.7 - 10.5 mg/dL 9.5    Ionized Calcium 1.06 - 1.42 mmol/L 1.26    Alkaline Phosphatase 55 - 135 U/L 65    PROTEIN TOTAL 6.0 - 8.4 g/dL 7.4    Albumin 3.5 - 5.2 g/dL 3.8    Uric Acid 2.4 - 5.7 mg/dL 5.2    BILIRUBIN TOTAL 0.1 - 1.0 mg/dL 0.3 [3]    AST 10 - 40 U/L 17    ALT 10 - 44 U/L 29    GGT 8 - 55 U/L 27    Ammonia 10 - 50 umol/L 40    Iodine, Serum 40 - 92 ng/mL 56 [4]    Lactate, Jose Eduardo 0.5 - 2.2 mmol/L 0.8 [5]    Vit D, 25-Hydroxy 30 - 96 ng/mL 25 (L) [6]    17-Hydroxyprogesterone 35 - 413 ng/dL 98 [7]    ALDOSTERONE ng/dL 8.0 [8]    Androstenedione ng/dL 150 [9]    TSH 0.400 - 4.000 uIU/mL 2.209    T3, Total 60 - 180 ng/dL 110    Free T4 0.71 - 1.51 ng/dL 0.87    Thyroglobulin Interpretation  SEE BELOW [10]    Thyroglobulin Antibody Screen <1.8 IU/mL <1.8    Thyroglobulin, Tumor Marker ng/mL 6.1 (H) [11]    PTH 9.0 - 77.0 pg/mL 86.9 (H)    AFP 0.0 - 8.4 ng/mL 2.1    Calcitonin <=7.6 pg/mL <5.0 [12]    Chromogranin A <93 ng/mL 27 [13]    HCG Quant See Text mIU/mL <1.2 [14]    Albumin 3.6 - 5.1 g/dL 4.1 [15]    DHEA 1.330 - 7.780 ng/mL 2.152 [16]     Dihydrotestosterone <=300 pg/mL 95 [17]    Estradiol See Text pg/mL 53 [18]  40 [19]    Estriol, Unconjugated, Serum <0.08 ng/mL <0.07 [20]    Estrogen pg/mL 275 [21]    Estrone pg/mL 109 [22]    FSH See Text mIU/mL 5.20 [23]    LH See Text mIU/mL 5.0 [24]    Progesterone See Text ng/mL 0.1 [25]    Prolactin 5.2 - 26.5 ng/mL 8.6    Sex Hormone Binding Globulin 17 - 124 nmol/L 20    Testosterone 2 - 45 ng/dL 82 (H)    Testosterone, Bioavailable 0.5 - 8.5 ng/dL 27.1 (H)    Testosterone, Free 0.2 - 5.0 pg/mL 14.4 (H)    US PELVIS COMPLETE NON OB   Rpt   Renin Activity ng/mL/h 1.5 [26]    (L): Data is abnormally low  (H): Data is abnormally high  Rpt: View report in Results Review for more information  [1] Mild elevation in immature granulocytes is non specific and    can be seen in a variety of conditions including stress response,    acute inflammation, trauma and pregnancy. Correlation with other    laboratory and clinical findings is essential.     [2] Calculation used to obtain the estimated glomerular filtration   rate (eGFR) is the CKD-EPI equation.      [3] For infants and newborns, interpretation of results should be based   on gestational age, weight and in agreement with clinical   observations.      Premature Infant recommended reference ranges:   Up to 24 hours.............<8.0 mg/dL   Up to 48 hours............<12.0 mg/dL   3-5 days..................<15.0 mg/dL   6-29 days.................<15.0 mg/dL     [4] -------------------ADDITIONAL INFORMATION-------------------   This test was developed and its performance characteristics    determined by [facility] in a manner consistent with CLIA    requirements. This test has not been cleared or approved by    the U.S. Food and Drug Administration.      Test Performed by:   [facility]   [address] [place] [address]   : Zana Khan M.D. Ph.D.; CLIA# [id number]     [5] Falsely low lactic  acid results can be found in samples    containing >=13.0 mg/dL total bilirubin and/or >=3.5 mg/dL    direct bilirubin.     [6] Vitamin D deficiency.........<10 ng/mL                               Vitamin D insufficiency......10-29 ng/mL        Vitamin D sufficiency........> or equal to 30 ng/mL   Vitamin D toxicity............>100 ng/mL     [7] Test performed at [facility],   [facility] [address] [place]  [address]     [phone]   Zana Rhodes MD  - Medical Director     [8] REFERENCE RANGE:      Upright              <= 39.2 ng/dL     Supine               <= 23.2 ng/dL      Test performed at [facility],   [address] [place], [place]  [id number] [phone]   Zana Rhodes MD  - Medical Director     [9] Adult Female Reference Ranges for   Androstenedione:      Mid Follicular:         ng/dL   Surge:                  ng/dL   Mid Luteal:             ng/dL   Postmenopausal Phase:  20-75 ng/dL      This test was developed and its analytical performance   characteristics have been determined by [facility]   [facility]. It has not been   cleared or approved by FDA. This assay has been validated   pursuant to the CLIA regulations and is used for clinical   purposes.   Test Performed at:   [facility] [address]  [address]     ISSAC Huffman MD, PhD, KATHERYN     [10] Thyroglobulin (Tg) levels must be interpreted in the context   of TSH levels, serial Tg measurements and radioiodine    ablation status.  Tg levels of 2.1-9.9 ng/mL in athyrotic   individuals on suppressive therapy indicate an increased    risk of clinically detectable recurrent    papillary/follicular thyroid cancer.         -------------------ADDITIONAL INFORMATION-------------------   PLEASE NOTE: A thyroglobulin antibody (TgAb) reference   cutoff of <4.0 IU/mL may be more suitable for the    evaluation of  autoimmune thyroiditis.   Thyroglobulin flagging is based on athyrotic   reference values.      The thyroglobulin and thyroglobulin antibody testing    methods are immunoenzymatic assays manufactured by [employer] and performed on the [employer] .      Values obtained from different assay methods or kits   may be different and cannot be used interchangeably.      The results cannot be interpreted as absolute evidence   for the presence or absence of malignant disease.      Test Performed by:   [place] [address]   : Zana Khan M.D. Ph.D.; CLIA# [id number]     [11] -------------------REFERENCE VALUE--------------------------   Athyrotic <0.1    Intact Thyroid <=33     [12] -------------------ADDITIONAL INFORMATION-------------------   The testing method is an electrochemiluminescence    assay manufactured by [employer] and    performed on the [employer] system.       Values obtained with different assay methods or kits    may be different and cannot be used interchangeably.      Test results cannot be interpreted as absolute evidence    for the presence or absence of malignant disease.      Test Performed by:   [place] [address]   : Zana Khan M.D. Ph.D.; CLIA# [id number]     [13] -------------------ADDITIONAL INFORMATION-------------------   This test was developed and its performance characteristics   determined by [facility] in a manner consistent with CLIA   requirements. This test has not been cleared or approved by    the U.S. Food and Drug Administration.      The testing method is a homogeneous time-resolved    immunofluorescent assay manufactured by [employer]    and performed on the [employer].      Values obtained with different assay methods or kits may be     different and cannot be used interchangeably.      Test results cannot be interpreted as absolute evidence for    the presence or absence of malignant disease.      Test Performed by:   [facility]   [address] [place] [address]   : Zana Khan M.D. Ph.D.; CLIA# [id number]     [14] Quantitative HCG Reference Ranges:   Males........................<5.0 mIU/mL   Non-Pregnant Females.........<5.0 mIU/mL   Pregnancy:   Weeks post-LMP...............Range (mIU/mL)   1-10  weeks.....................202-231,000   11-15 weeks..................22,536-234,990   16-22 weeks...................8,007-50,064   23-40 weeks...................1,600-49,413      NOTE:   This assay is not FDA approved for tumor screening,    diagnosis, or monitoring.     [15] For additional information, please refer to    http://education.[employer].Nanorex/faq/GOW080 (This link is    being provided for informational/ educational purposes only.)      This test was developed and its analytical performance    characteristics have been determined by [facility]. It has not been cleared or approved by the    US Food and Drug Administration. This assay has been validated    pursuant to the CLIA regulations and is used for clinical    purposes.   @ Test Performed By:   [facility]   Grey Perez M.D.,    [address] [place] [address]   CLIA  [id number]     [16] INTERPRETIVE INFORMATION: Dehydroepiandrosterone, Females    18 years and older:   Postmenopausal: 0.60-5.73 ng/mL   REFERENCE INTERVAL: Dehydroepiandrosterone by TMS   Access complete set of age- and/or gender-specific    reference intervals for this test in the [facility]    Test Directory ([email or web address]).   This test was developed and its performance characteristics    determined by [facility]. It has not been cleared  or    approved by the US Food and Drug Administration. This test    was performed in a CLIA certified laboratory and is    intended for clinical purposes.   Performed By: [facility]   [address] [place] [id number]   : Jessica Vidal MD     [17] -------------------ADDITIONAL INFORMATION-------------------   This test was developed and its performance characteristics    determined by [facility] in a manner consistent with CLIA    requirements. This test has not been cleared or approved by    the U.S. Food and Drug Administration.      Test Performed by:   [facility] [place] [address]   : Zana Khan M.D. Ph.D.; CLIA# [id number]     [18] -------------------REFERENCE VALUE--------------------------   Premenopausal:  (E2 levels vary widely through the    menstrual cycle.)   Postmenopausal: <10      -------------------ADDITIONAL INFORMATION-------------------   This test was developed and its performance characteristics    determined by [facility] in a manner consistent with CLIA    requirements. This test has not been cleared or approved by    the U.S. Food and Drug Administration.      Test Performed by:   [facility] [place] [address]   : Zana Khan M.D. Ph.D.; CLIA# [id number]     [19] Estradiol Reference Ranges (Female):   Follicular phase:  pg/mL   Midcycle:          pg/mL   Luteal phase:      pg/mL   Post-menopausal(Not on HRT): <10-28 pg/mL   Post-menopausal(On HRT): < pg/mL   Males: 11-44 pg/mL      The drug Fulvestrant (Faslodex) may interfere with the    assay leading to falsely elevated Estradiol results.      Patients treated with Mifepristone should not be tested with   the  or Alinity I Estradiol assay for up to two    weeks due to the interfernce of the  drug in this assay.     [20] Test Performed by:   [facility]   [address] [facility], [place] [address]   : Zana Khan M.D. Ph.D.; CLIA# [id number]     [21] Females:    Prepubertal:                                <40 pg/mL   Postmenopausal:                             <40 pg/mL      Female Menstrual Cycle:     1-10 days:                                   pg/mL   11-20 days:                                 122-437 pg/mL   21-30 days:                                 156-350 pg/mL      HMG Treatment    (Therapeutic range):                        400-800 pg/mL      Males:    Prepubertal:                                <40 pg/mL    Adult:                                       pg/mL      Test performed at [facility],   [address] [place] [address]  [address] [phone]   Zana Rhodes MD  - Medical Director     [22] Premenopausal:  17 - 200   Postmenopausal: 7 - 40     [23] Female Reference Ranges:   Follicular Phase.................3.03-8.08 mIU/mL   Midcycle Peak....................2.55-16.69 mIU/mL   Luteal Phase.....................1.38-5.47 mIU/mL   Postmenopausal...................26..41 mIU/mL   Male Reference Range:............0.95-11.95 mIU/mL     [24] Female Reference Ranges:   Follicular phase.............1.8-11.8 mIU/mL   Midcycle phase...............7.6-89.1 mIU/mL   Luteal phase.................0.6-14.0 mIU/mL   Post-menopausal without HRT..5.2-62.0 mIU/mL   Male Reference Interval......0.6-12.1 mIU/mL     [25] Female Reference Ranges:   Follicular phase...............<0.3 ng/mL   Luteal phase...................1.2-15.9 ng/mL   Post-menopausal................<0.2 ng/mL   Pregnant, 1st Trimester........2.8-147.3 ng/mL   Pregnant, 2nd Trimester........22.5-95.3 ng/mL   Pregnant, 3rd Trimester........27.9-243 ng/mL   Male Reference range...........<0.2 ng/mL     [26] -------------------REFERENCE  VALUE--------------------------   (Peripheral vein specimen)   Na-deplete, upright:   Mean: 10.8   Range: 2.9-24   Na-replete, upright:   Mean: 1.9   Range: < or =0.6-4.3      -------------------ADDITIONAL INFORMATION-------------------   Testing performed by Liquid Chromatography-Tandem Mass    Spectrometry (LC-MS/MS).   This test was developed and its performance characteristics    determined by [facility] in a manner consistent with CLIA    requirements. This test has not been cleared or approved by    the U.S. Food and Drug Administration.      Test Performed by:   [facility]   [address] [place] [address]   : Zana Khan M.D. Ph.D.; CLIA# [id number]       Assessment:     1. Abnormal thyroid blood test     2. Acquired hypothyroidism  CBC Auto Differential   3. Thyroiditis     4. Ovarian enlargement, left     5. Ovarian enlargement, right     6. Nodular thyroid disease  US Soft Tissue Head Neck Thyroid    TSH   7. Dysmetabolic syndrome  Uric Acid    Microalbumin/Creatinine Ratio, Urine    Urinalysis   8. Hypovitaminosis D  Vitamin D    PTH, Intact    Calcium, Ionized   9. Goiter  US Soft Tissue Head Neck Thyroid    TSH   10. Prediabetes  Lipid Panel    Hemoglobin A1C    Amylase    Lipase   11. NAFLD (nonalcoholic fatty liver disease)  CBC Auto Differential    Comprehensive Metabolic Panel    Uric Acid    Lipid Panel    Lactic Acid, Plasma    Ammonia    Gamma GT    US Elastography Liver    AFP Tumor Marker    Amylase    Lipase   12. Obstructive sleep apnea  Aldosterone    Renin   13. Anxiety     14. Other depression     15. Hirsutism  Testosterone Panel    DHEA-Sulfate    DHEA    Estradiol    Estrogens, Total    Progesterone   16. Dyslipidemia  Lipid Panel   17. Dysglycemia  Hemoglobin A1C   18. Polycystic ovarian syndrome  DHEA-Sulfate    DHEA    Estradiol    Estrogens, Total    Progesterone    Prolactin    Follicle Stimulating Hormone     Luteinizing Hormone    Androstenedione   19. Abnormal tumor markers  AFP Tumor Marker        Regarding hypothyroidism; Patient appears clinically euthyroid. To recheckTFTs today but based on results will need to restart LT4 repletion therapy.  Regarding thyroid nodular disease; To also obtain ffup thyroid USS to evaluate current status of previously noted thyroid nodular disease.  2. Regarding hypovitaminosis D; to continue OTC vitamin d repletion and will recheck 25 OH vit d levels to guide need for any dose adjustment.  3. Regarding morbid obesity; Dicussed with patient basic strategies for effective weight management and referred to dietician for indepth calorie deficit counseling. Provided the patient literature to assist with weight loss deficit plans. Encouraged to sustain low calorie diet but to transition fro keto to healthier meal plans like DASH, Flexetarian or Mediterranean diet plans.  4. Regarding insulin resistance and dysmetabolic syndrome; to restart low dose metformin; 500mg BID with progression to 1000mg BID as tolerated and check basic secreening labs to evaluate current glycemic status.  5. Regarding hirsutism and PCOS; Discussed possible management options with the patient who wants to hold of on commencing OCPs at the present time. To repeat pelvic USS.  6. Regarding OSAS; encouraged to uses CPAP as much as possible.   7. Regarding depression; to continue lexapro as before.  Regarding hyperandrogensim; Pelvic USS showe features suggestive of possible hyperthecosis. To obtain Prempro suppression test to evaluate impact of same on androgen production.      Due to the current nation wide acute severe supply chain deficit in blood, urine and other lab sample tubes and collection containers, typical labs will not be obtained in the usual profile and  frequency they were obtained in time past for clinical surveillance, investigation, monitoring and/or management.    Plan:       FFup in ~  4mths

## 2022-04-12 LAB
AFP SERPL-MCNC: <2 NG/ML (ref 0–8.4)
AMMONIA PLAS-SCNC: 44 UMOL/L (ref 10–50)
AMYLASE SERPL-CCNC: 45 U/L (ref 20–110)
DHEA-S SERPL-MCNC: 312.5 UG/DL (ref 74.8–410.2)
ESTRADIOL SERPL-MCNC: 177 PG/ML
FSH SERPL-ACNC: 1.77 MIU/ML
LACTATE SERPL-SCNC: 1.6 MMOL/L (ref 0.5–2.2)
LH SERPL-ACNC: 3.1 MIU/ML
LIPASE SERPL-CCNC: 28 U/L (ref 4–60)
PROGEST SERPL-MCNC: 11.2 NG/ML
PROLACTIN SERPL IA-MCNC: 7.4 NG/ML (ref 5.2–26.5)
URATE SERPL-MCNC: 5.3 MG/DL (ref 2.4–5.7)

## 2022-04-14 ENCOUNTER — PATIENT MESSAGE (OUTPATIENT)
Dept: ENDOCRINOLOGY | Facility: CLINIC | Age: 42
End: 2022-04-14
Payer: COMMERCIAL

## 2022-04-14 LAB — ESTROGEN SERPL-MCNC: 451 PG/ML

## 2022-04-15 LAB
ALDOST SERPL-MCNC: 17.4 NG/DL
ANDROST SERPL-MCNC: 150 NG/DL
RENIN PLAS-CCNC: 3.6 NG/ML/H

## 2022-04-16 LAB
ALBUMIN SERPL-MCNC: 4.3 G/DL (ref 3.6–5.1)
SHBG SERPL-SCNC: 41 NMOL/L (ref 17–124)
TESTOST FREE SERPL-MCNC: 2.7 PG/ML (ref 0.2–5)
TESTOST SERPL-MCNC: 27 NG/DL (ref 2–45)
TESTOSTERONE.FREE+WB SERPL-MCNC: 5.3 NG/DL (ref 0.5–8.5)

## 2022-04-18 ENCOUNTER — TELEPHONE (OUTPATIENT)
Dept: ENDOCRINOLOGY | Facility: CLINIC | Age: 42
End: 2022-04-18
Payer: COMMERCIAL

## 2022-04-18 NOTE — TELEPHONE ENCOUNTER
Attempted to notify pt that Dr Hidalgo did do Testosterone level within her labs. When lab is resulted Dr will give impression and we will call her with the results of the testing. No ans, LVM

## 2022-04-23 LAB — DHEA SERPL-MCNC: 2.69 NG/ML (ref 1.33–7.78)

## 2022-05-31 ENCOUNTER — PATIENT MESSAGE (OUTPATIENT)
Dept: ADMINISTRATIVE | Facility: HOSPITAL | Age: 42
End: 2022-05-31
Payer: COMMERCIAL

## 2022-06-02 RX ORDER — ALBUTEROL SULFATE 90 UG/1
AEROSOL, METERED RESPIRATORY (INHALATION)
Qty: 54 G | Refills: 0 | Status: SHIPPED | OUTPATIENT
Start: 2022-06-02

## 2022-06-02 NOTE — TELEPHONE ENCOUNTER
Care Due:                  Date            Visit Type   Department     Provider  --------------------------------------------------------------------------------                                MYCHART                              ANNUAL                              CHECKUP/PHY  [facility]  Last Visit: 03-      S            ALEXUS Jeffers  Next Visit: None Scheduled  None         None Found                                                            Last  Test          Frequency    Reason                     Performed    Due Date  --------------------------------------------------------------------------------    Office Visit  12 months..  EScitalopram.............  03- 03-    [facility] Embedded Care Gaps. Reference number: [id number]. 6/02/2022   2:55:50 PM CDT

## 2022-06-03 NOTE — TELEPHONE ENCOUNTER
Refill Authorization Note   Meagan Bran  is requesting a refill authorization.  Brief Assessment and Rationale for Refill:  Approve    -Medication-Related Problems Identified: Requires appointment  Medication Therapy Plan:       Medication Reconciliation Completed: No   Comments:     Provider Staff:     Action is required for this patient.   Please see care gap opportunities below in Care Due Message.     Thanks!  [facility]     Appointments      Date Provider   Last Visit   3/22/2021 Julius Jeffers Jr., MD   Next Visit   Visit date not found Julius Jeffers Jr., MD     Note composed:8:04 PM 06/02/2022           Note composed:8:04 PM 06/02/2022

## 2022-06-11 DIAGNOSIS — J45.20 MILD INTERMITTENT ASTHMA WITHOUT COMPLICATION: ICD-10-CM

## 2022-06-11 RX ORDER — BUDESONIDE 0.5 MG/2ML
0.5 INHALANT ORAL DAILY
Qty: 100 ML | Refills: 11 | Status: CANCELLED | OUTPATIENT
Start: 2022-06-11

## 2022-06-11 RX ORDER — ALBUTEROL SULFATE 1.25 MG/3ML
1.25 SOLUTION RESPIRATORY (INHALATION) EVERY 6 HOURS PRN
Qty: 72 ML | Refills: 11 | Status: CANCELLED | OUTPATIENT
Start: 2022-06-11

## 2022-06-20 ENCOUNTER — OFFICE VISIT (OUTPATIENT)
Dept: FAMILY MEDICINE | Facility: CLINIC | Age: 42
End: 2022-06-20
Payer: COMMERCIAL

## 2022-06-20 VITALS
SYSTOLIC BLOOD PRESSURE: 124 MMHG | BODY MASS INDEX: 47.61 KG/M2 | TEMPERATURE: 99 F | OXYGEN SATURATION: 98 % | DIASTOLIC BLOOD PRESSURE: 70 MMHG | HEIGHT: 60 IN | WEIGHT: 242.5 LBS | HEART RATE: 64 BPM

## 2022-06-20 DIAGNOSIS — F41.9 ANXIETY: ICD-10-CM

## 2022-06-20 DIAGNOSIS — J45.20 MILD INTERMITTENT ASTHMA WITHOUT COMPLICATION: Primary | ICD-10-CM

## 2022-06-20 PROCEDURE — 3044F HG A1C LEVEL LT 7.0%: CPT | Mod: CPTII,S$GLB,, | Performed by: PHYSICIAN ASSISTANT

## 2022-06-20 PROCEDURE — 99999 PR PBB SHADOW E&M-EST. PATIENT-LVL IV: CPT | Mod: PBBFAC,,, | Performed by: PHYSICIAN ASSISTANT

## 2022-06-20 PROCEDURE — 3078F PR MOST RECENT DIASTOLIC BLOOD PRESSURE < 80 MM HG: ICD-10-PCS | Mod: CPTII,S$GLB,, | Performed by: PHYSICIAN ASSISTANT

## 2022-06-20 PROCEDURE — 3008F PR BODY MASS INDEX (BMI) DOCUMENTED: ICD-10-PCS | Mod: CPTII,S$GLB,, | Performed by: PHYSICIAN ASSISTANT

## 2022-06-20 PROCEDURE — 3008F BODY MASS INDEX DOCD: CPT | Mod: CPTII,S$GLB,, | Performed by: PHYSICIAN ASSISTANT

## 2022-06-20 PROCEDURE — 1159F MED LIST DOCD IN RCRD: CPT | Mod: CPTII,S$GLB,, | Performed by: PHYSICIAN ASSISTANT

## 2022-06-20 PROCEDURE — 3074F SYST BP LT 130 MM HG: CPT | Mod: CPTII,S$GLB,, | Performed by: PHYSICIAN ASSISTANT

## 2022-06-20 PROCEDURE — 3044F PR MOST RECENT HEMOGLOBIN A1C LEVEL <7.0%: ICD-10-PCS | Mod: CPTII,S$GLB,, | Performed by: PHYSICIAN ASSISTANT

## 2022-06-20 PROCEDURE — 99214 PR OFFICE/OUTPT VISIT, EST, LEVL IV, 30-39 MIN: ICD-10-PCS | Mod: S$GLB,,, | Performed by: PHYSICIAN ASSISTANT

## 2022-06-20 PROCEDURE — 99214 OFFICE O/P EST MOD 30 MIN: CPT | Mod: S$GLB,,, | Performed by: PHYSICIAN ASSISTANT

## 2022-06-20 PROCEDURE — 99999 PR PBB SHADOW E&M-EST. PATIENT-LVL IV: ICD-10-PCS | Mod: PBBFAC,,, | Performed by: PHYSICIAN ASSISTANT

## 2022-06-20 PROCEDURE — 3078F DIAST BP <80 MM HG: CPT | Mod: CPTII,S$GLB,, | Performed by: PHYSICIAN ASSISTANT

## 2022-06-20 PROCEDURE — 3074F PR MOST RECENT SYSTOLIC BLOOD PRESSURE < 130 MM HG: ICD-10-PCS | Mod: CPTII,S$GLB,, | Performed by: PHYSICIAN ASSISTANT

## 2022-06-20 PROCEDURE — 1159F PR MEDICATION LIST DOCUMENTED IN MEDICAL RECORD: ICD-10-PCS | Mod: CPTII,S$GLB,, | Performed by: PHYSICIAN ASSISTANT

## 2022-06-20 RX ORDER — ESCITALOPRAM OXALATE 10 MG/1
10 TABLET ORAL DAILY
Qty: 30 TABLET | Refills: 11 | Status: SHIPPED | OUTPATIENT
Start: 2022-06-20 | End: 2023-07-06

## 2022-06-20 RX ORDER — HYDROXYZINE HYDROCHLORIDE 25 MG/1
25 TABLET, FILM COATED ORAL
Qty: 30 TABLET | Refills: 3 | Status: SHIPPED | OUTPATIENT
Start: 2022-06-20 | End: 2023-07-06

## 2022-06-20 RX ORDER — AZITHROMYCIN 250 MG/1
250 TABLET, FILM COATED ORAL
COMMUNITY
Start: 2022-06-11

## 2022-06-20 RX ORDER — BUDESONIDE 0.5 MG/2ML
0.5 INHALANT ORAL DAILY
Qty: 100 ML | Refills: 11 | Status: SHIPPED | OUTPATIENT
Start: 2022-06-20

## 2022-06-20 RX ORDER — ALBUTEROL SULFATE 1.25 MG/3ML
1.25 SOLUTION RESPIRATORY (INHALATION) EVERY 6 HOURS PRN
Qty: 72 ML | Refills: 11 | Status: SHIPPED | OUTPATIENT
Start: 2022-06-20

## 2022-06-20 NOTE — PROGRESS NOTES
Subjective:       Patient ID: Meagan Bran is a 41 y.o. female.    Chief Complaint: Annual Exam    Patient with PCOS, mild intermittent asthma, hypothyroidism, dysmetabolic syndrome, and anxiety presents for routine follow up. All chronic conditions are stable. Notes increased anxiety symptoms week before menses.  Menses more regular now since some weight loss.  Patients patient medical/surgical, social and family histories have been reviewed       Review of Systems   Constitutional: Negative for activity change and unexpected weight change.   HENT: Negative for hearing loss, rhinorrhea and trouble swallowing.    Eyes: Negative for discharge and visual disturbance.   Respiratory: Positive for wheezing. Negative for chest tightness.    Cardiovascular: Negative for chest pain and palpitations.   Gastrointestinal: Negative for blood in stool, constipation, diarrhea and vomiting.   Endocrine: Negative for polydipsia and polyuria.   Genitourinary: Negative for difficulty urinating, dysuria, hematuria and menstrual problem.   Musculoskeletal: Negative for arthralgias, joint swelling and neck pain.   Neurological: Negative for weakness and headaches.   Psychiatric/Behavioral: Negative for confusion and dysphoric mood. The patient is nervous/anxious.        Objective:      Physical Exam  Constitutional:       General: She is not in acute distress.     Appearance: Normal appearance. She is well-developed. She is not ill-appearing.   HENT:      Head: Normocephalic and atraumatic.   Eyes:      Conjunctiva/sclera: Conjunctivae normal.   Cardiovascular:      Rate and Rhythm: Normal rate and regular rhythm.      Heart sounds: Normal heart sounds.   Pulmonary:      Effort: Pulmonary effort is normal.      Breath sounds: Normal breath sounds.   Musculoskeletal:      Right lower leg: No edema.      Left lower leg: No edema.   Skin:     General: Skin is warm and dry.   Neurological:      Mental Status: She is alert.   Psychiatric:   "       Mood and Affect: Mood normal.         Behavior: Behavior normal. Behavior is cooperative.         Thought Content: Thought content normal.         Judgment: Judgment normal.         Assessment:       1. Mild intermittent asthma without complication    2. Anxiety        Plan:       Meagan was seen today for annual exam.    Diagnoses and all orders for this visit:    Mild intermittent asthma without complication  -     albuterol (ACCUNEB) 1.25 mg/3 mL Nebu; Take 3 mLs (1.25 mg total) by nebulization every 6 (six) hours as needed.  -     budesonide (PULMICORT) 0.5 mg/2 mL nebulizer solution; Take 2 mLs (0.5 mg total) by nebulization once daily.    Anxiety  -     hydrOXYzine HCL (ATARAX) 25 MG tablet; Take 1 tablet (25 mg total) by mouth every 6 to 8 hours as needed for Itching.  -     EScitalopram oxalate (LEXAPRO) 10 MG tablet; Take 1 tablet (10 mg total) by mouth once daily.     mayn increase to 15 mg qd for pre menstrual week                 Documentation entered by me for this encounter may have been done in part using speech-recognition technology. Although I have made an effort to ensure accuracy, "sound like" errors may exist and should be interpreted in context.   I spent a total of  minutes on the day of the visit.This includes face to face time and non-face to face time preparing to see the patient (eg, review of tests), obtaining and/or reviewing separately obtained history, documenting clinical information in the electronic or other health record, independently interpreting results and communicating results to the patient/family/caregiver, or care coordinator.    "

## 2022-07-07 ENCOUNTER — HOSPITAL ENCOUNTER (OUTPATIENT)
Dept: RADIOLOGY | Facility: HOSPITAL | Age: 42
Discharge: HOME | End: 2022-07-07
Attending: INTERNAL MEDICINE
Payer: COMMERCIAL

## 2022-07-07 DIAGNOSIS — E04.1 NODULAR THYROID DISEASE: ICD-10-CM

## 2022-07-07 DIAGNOSIS — E04.9 GOITER: ICD-10-CM

## 2022-07-07 PROCEDURE — 76536 US EXAM OF HEAD AND NECK: CPT | Mod: 26,,, | Performed by: RADIOLOGY

## 2022-07-07 PROCEDURE — 76536 US EXAM OF HEAD AND NECK: CPT | Mod: TC

## 2022-07-07 PROCEDURE — 76536 US SOFT TISSUE HEAD NECK THYROID: ICD-10-PCS | Mod: 26,,, | Performed by: RADIOLOGY

## 2022-07-18 ENCOUNTER — PATIENT MESSAGE (OUTPATIENT)
Dept: ENDOCRINOLOGY | Facility: CLINIC | Age: 42
End: 2022-07-18
Payer: COMMERCIAL

## 2022-07-18 ENCOUNTER — TELEPHONE (OUTPATIENT)
Dept: ENDOCRINOLOGY | Facility: CLINIC | Age: 42
End: 2022-07-18

## 2022-07-18 DIAGNOSIS — K76.0 NAFLD (NONALCOHOLIC FATTY LIVER DISEASE): Primary | ICD-10-CM

## 2022-08-01 ENCOUNTER — DOCUMENTATION ONLY (OUTPATIENT)
Dept: ENDOCRINOLOGY | Facility: CLINIC | Age: 42
End: 2022-08-01
Payer: COMMERCIAL

## 2022-08-01 NOTE — PROGRESS NOTES
I have reviewed the results of the recent Liver fibroscan obtained on the patient @ [facility] on 07/28/22.  The results of the scan in full are scanned into the media tab section of the patients Epic records.  The salient finding of note is that her focal median shear pressure and velocity are 4.4 KPa and 1.2m/s respectively consistent with normal (F0) fibrosis score.  This is an excellent result and will inform patient of same.

## 2022-08-02 ENCOUNTER — TELEPHONE (OUTPATIENT)
Dept: ENDOCRINOLOGY | Facility: CLINIC | Age: 42
End: 2022-08-02
Payer: COMMERCIAL

## 2022-08-02 NOTE — TELEPHONE ENCOUNTER
----- Message from Rhys Hidalgo MD sent at 8/1/2022 12:49 PM CDT -----  Regarding: Hepatic fibroscan  Kindly inform the patient that her recent fibroscan shows no significant scarring in her liver. This is a very good result. She should continue her ongoing efforts at weight loss to reduce the amount of fat accumulation in her liver.  Thanks    Rhys Hidalgo MD

## 2022-09-14 DIAGNOSIS — Z12.31 OTHER SCREENING MAMMOGRAM: ICD-10-CM

## 2022-09-19 ENCOUNTER — PATIENT MESSAGE (OUTPATIENT)
Dept: ADMINISTRATIVE | Facility: HOSPITAL | Age: 42
End: 2022-09-19
Payer: COMMERCIAL

## 2022-10-10 ENCOUNTER — PATIENT MESSAGE (OUTPATIENT)
Dept: ADMINISTRATIVE | Facility: HOSPITAL | Age: 42
End: 2022-10-10
Payer: COMMERCIAL

## 2022-10-25 ENCOUNTER — OFFICE VISIT (OUTPATIENT)
Dept: ENDOCRINOLOGY | Facility: CLINIC | Age: 42
End: 2022-10-25
Payer: COMMERCIAL

## 2022-10-25 VITALS
SYSTOLIC BLOOD PRESSURE: 100 MMHG | DIASTOLIC BLOOD PRESSURE: 70 MMHG | BODY MASS INDEX: 44.21 KG/M2 | OXYGEN SATURATION: 97 % | HEIGHT: 60 IN | HEART RATE: 67 BPM | TEMPERATURE: 98 F | WEIGHT: 225.19 LBS

## 2022-10-25 DIAGNOSIS — R74.01 TRANSAMINITIS: ICD-10-CM

## 2022-10-25 DIAGNOSIS — K75.81 NASH (NONALCOHOLIC STEATOHEPATITIS): ICD-10-CM

## 2022-10-25 DIAGNOSIS — E03.9 ACQUIRED HYPOTHYROIDISM: ICD-10-CM

## 2022-10-25 DIAGNOSIS — F41.9 ANXIETY: ICD-10-CM

## 2022-10-25 DIAGNOSIS — F32.89 OTHER DEPRESSION: ICD-10-CM

## 2022-10-25 DIAGNOSIS — E04.9 GOITER: ICD-10-CM

## 2022-10-25 DIAGNOSIS — K76.0 NAFLD (NONALCOHOLIC FATTY LIVER DISEASE): ICD-10-CM

## 2022-10-25 DIAGNOSIS — E28.8 HYPERANDROGENISM: ICD-10-CM

## 2022-10-25 DIAGNOSIS — L68.0 HIRSUTISM: ICD-10-CM

## 2022-10-25 DIAGNOSIS — R73.03 PREDIABETES: ICD-10-CM

## 2022-10-25 DIAGNOSIS — E04.1 NODULAR THYROID DISEASE: Primary | ICD-10-CM

## 2022-10-25 DIAGNOSIS — E55.9 HYPOVITAMINOSIS D: ICD-10-CM

## 2022-10-25 DIAGNOSIS — E88.810 DYSMETABOLIC SYNDROME: ICD-10-CM

## 2022-10-25 DIAGNOSIS — E06.9 THYROIDITIS: ICD-10-CM

## 2022-10-25 DIAGNOSIS — E28.2 POLYCYSTIC OVARIAN SYNDROME: ICD-10-CM

## 2022-10-25 DIAGNOSIS — R73.09 DYSGLYCEMIA: ICD-10-CM

## 2022-10-25 PROCEDURE — 3078F DIAST BP <80 MM HG: CPT | Mod: CPTII,S$GLB,, | Performed by: INTERNAL MEDICINE

## 2022-10-25 PROCEDURE — 99999 PR PBB SHADOW E&M-EST. PATIENT-LVL IV: CPT | Mod: PBBFAC,,, | Performed by: INTERNAL MEDICINE

## 2022-10-25 PROCEDURE — 3044F HG A1C LEVEL LT 7.0%: CPT | Mod: CPTII,S$GLB,, | Performed by: INTERNAL MEDICINE

## 2022-10-25 PROCEDURE — 99214 PR OFFICE/OUTPT VISIT, EST, LEVL IV, 30-39 MIN: ICD-10-PCS | Mod: S$GLB,,, | Performed by: INTERNAL MEDICINE

## 2022-10-25 PROCEDURE — 1159F PR MEDICATION LIST DOCUMENTED IN MEDICAL RECORD: ICD-10-PCS | Mod: CPTII,S$GLB,, | Performed by: INTERNAL MEDICINE

## 2022-10-25 PROCEDURE — 3074F SYST BP LT 130 MM HG: CPT | Mod: CPTII,S$GLB,, | Performed by: INTERNAL MEDICINE

## 2022-10-25 PROCEDURE — 99214 OFFICE O/P EST MOD 30 MIN: CPT | Mod: S$GLB,,, | Performed by: INTERNAL MEDICINE

## 2022-10-25 PROCEDURE — 99999 PR PBB SHADOW E&M-EST. PATIENT-LVL IV: ICD-10-PCS | Mod: PBBFAC,,, | Performed by: INTERNAL MEDICINE

## 2022-10-25 PROCEDURE — 3078F PR MOST RECENT DIASTOLIC BLOOD PRESSURE < 80 MM HG: ICD-10-PCS | Mod: CPTII,S$GLB,, | Performed by: INTERNAL MEDICINE

## 2022-10-25 PROCEDURE — 1159F MED LIST DOCD IN RCRD: CPT | Mod: CPTII,S$GLB,, | Performed by: INTERNAL MEDICINE

## 2022-10-25 PROCEDURE — 3044F PR MOST RECENT HEMOGLOBIN A1C LEVEL <7.0%: ICD-10-PCS | Mod: CPTII,S$GLB,, | Performed by: INTERNAL MEDICINE

## 2022-10-25 PROCEDURE — 3074F PR MOST RECENT SYSTOLIC BLOOD PRESSURE < 130 MM HG: ICD-10-PCS | Mod: CPTII,S$GLB,, | Performed by: INTERNAL MEDICINE

## 2022-10-25 PROCEDURE — 1160F PR REVIEW ALL MEDS BY PRESCRIBER/CLIN PHARMACIST DOCUMENTED: ICD-10-PCS | Mod: CPTII,S$GLB,, | Performed by: INTERNAL MEDICINE

## 2022-10-25 PROCEDURE — 1160F RVW MEDS BY RX/DR IN RCRD: CPT | Mod: CPTII,S$GLB,, | Performed by: INTERNAL MEDICINE

## 2022-10-25 NOTE — PROGRESS NOTES
Subjective:      Patient ID: Meagan Bran is a 42 y.o. female.    Chief Complaint:  Hypothyroidism    Patient is a 42 yr old lady seen in [facility] today  with hypothyroidism, PCOS/other ovarian hyperandrogenic syndrome and morbid obesity seen in [facility] today.    History of Present Illness    The patient, Ms Bran is a 42 yr old lady seen in [facility] today on account of hypothyroidism presumably due to autoimmune thyroiditis for which she is on thyroid hormone repletion; LT4 75mcg Qd. She in addition has OSAS does not use a CPAP mask presently (difficulty tolerating same), hypovitaminosis D, morbid obesity and ?? Depression on treatment.    Labs done @ [facility] show elevated total testosterone; 59ng/ml (8-48), TSH of 3.37 and LH/FSH of 6.4/3.7 with normal prolactin; 9.3, elevated insulin levels; 49 and DHEAs; 209.3 which is wnl. These labs were from 01/16.  Her prior pelvic USS from 2011 was essentially normal with no evidence of cystic ovarian disease noted. Overall findings are consistent with PCOS with hyperandrogenism. She is presently on low dose metformin (500mg TWICE DAILY) and Lt4 75mcg DAILY.  Her screening thyroid USs from 07/16 showed sub cm thyroid nodular disease but one of the nodules had some calcifications noted (exact morphology not stated).   Her most recent thyroid USS from 07/22 showed stable thyroid nodular disease and her next [facility] thyroid USS should be for ~ 07/23.    Patients last period was 10/14 and this was associated with menorrhagia and last ~ 7 days. She is yet to have her November period.  She has some mental fogginess and fatigue which are chronic.  Her baseline [place] score is 8.The patient has apparently been of metformin ( she apparently never started taking it) and levothyroxine by her self decision for several months now.  She has gained an additional 30+ lbs since the last time she was seen in the system from 06/18.  She has been amenorrhiec for ~ 10 months now. She has also noted  increase in her hirsustism since her last visit. Patients father had bariatic surgery.   Patient has two sisters. Patients older sister apparently has PCOS and her younger sister had one miscarraige.     Patient's measured RMR from 11/3/2020 is 2060kcal/day. This is quite close and comparable to the estimated BMR (2193 Kcal/day)  from her prior body composition studies and the Chan Dora equation computations.  This suggests an estimated DILLON of ~ 2575 kcal /day.   Her daily caloric intake thus based on this to achieve a significant caloric deficit to enable sustained weight loss would be ~ 1030-7653 kcal/day.  She has lost 83lbs over the last year. She has been on the keto diet since 09/22.  Since then her periods have become more consistent. So far this year she has 3 periods.   She admits that she has not been taking the metformin nor thyroid medication.  She has had her periods normalize as well k: 5-7/30-32. She has menorrhagia ++ and mild dysmennorhea.  The hirsutism is the same.    Patient has lost 103lbs since her last visit. She had commence a reduced caloric intake.   I have reviewed the results of the recent Liver fibroscan obtained on the patient @ [facility] on 07/28/22.  The results of the scan in full are scanned into the media tab section of the patients Epic records.  The salient finding of note is that her focal median shear pressure and velocity are 4.4 KPa and 1.2m/s respectively consistent with normal (F0) fibrosis score.  This is an excellent result and will inform patient of same.    Review of Systems   Constitutional:  Positive for unexpected weight change (interval weight gain). Negative for activity change, appetite change, diaphoresis and fatigue.   HENT:  Negative for facial swelling, hearing loss, trouble swallowing and voice change.    Eyes:  Negative for photophobia and visual disturbance.   Respiratory:  Negative for cough and shortness of breath.    Cardiovascular:  Negative for chest  pain, palpitations and leg swelling.   Gastrointestinal:  Negative for abdominal distention, abdominal pain, constipation, diarrhea, nausea and vomiting.   Endocrine: Negative for cold intolerance, heat intolerance, polydipsia, polyphagia and polyuria.   Genitourinary:  Positive for menstrual problem (amenorrheic). Negative for difficulty urinating, dysuria, flank pain, frequency, hematuria and urgency.   Musculoskeletal:  Negative for arthralgias, back pain, gait problem, neck pain and neck stiffness.   Skin:  Negative for color change, pallor and rash (Hirsutism which is gradually worsening.).   Neurological:  Negative for dizziness, tremors, seizures, syncope, weakness, light-headedness, numbness and headaches.   Hematological:  Does not bruise/bleed easily.   Psychiatric/Behavioral:  Negative for agitation, confusion, dysphoric mood, hallucinations and sleep disturbance. The patient is not nervous/anxious.      Objective:   /70 (BP Location: Left arm, Patient Position: Sitting, BP Method: Large (Manual))   Pulse 67   Temp 98.3 °F (36.8 °C) (Oral)   Ht 5' (1.524 m)   Wt 102.2 kg (225 lb 3.2 oz)   SpO2 97%   BMI 43.98 kg/m²  Body surface area is 2.08 meters squared.         Physical Exam  Vitals and nursing note reviewed.   Constitutional:       General: She is not in acute distress.     Appearance: She is well-developed. She is obese. She is not ill-appearing or diaphoretic.      Comments: Pleasant young lady. Obese. Not pale, anicteric and afebrile. Well hydrated. Not in any acute distress.  Mildly hirsuite with associated hypertrichosis.   HENT:      Head: Normocephalic and atraumatic. Not macrocephalic. No right periorbital erythema or left periorbital erythema. Hair is normal.        Comments: Nuchal AN.     Nose: Nose normal.      Mouth/Throat:      Mouth: Mucous membranes are not pale and not dry.      Pharynx: No oropharyngeal exudate.   Eyes:      General: Lids are normal. No scleral icterus.         Right eye: No discharge.         Left eye: No discharge.      Conjunctiva/sclera: Conjunctivae normal.      Pupils: Pupils are equal, round, and reactive to light.   Neck:      Thyroid: No thyromegaly.      Vascular: Normal carotid pulses. No carotid bruit or JVD.      Trachea: No tracheal deviation.        Comments: Nuchal AN with multiple small skin tags in the neck and upper chest area.  Cardiovascular:      Rate and Rhythm: Normal rate and regular rhythm.      Chest Wall: PMI is not displaced.      Pulses: Normal pulses.      Heart sounds: Normal heart sounds, S1 normal and S2 normal. No murmur heard.    No gallop.   Pulmonary:      Effort: Pulmonary effort is normal. No respiratory distress.      Breath sounds: Normal breath sounds. No stridor. No wheezing, rhonchi or rales.   Abdominal:      General: Bowel sounds are normal. There is no distension.      Palpations: Abdomen is soft. There is no hepatomegaly, splenomegaly or mass.      Tenderness: There is no abdominal tenderness. There is no guarding or rebound.      Hernia: No hernia is present. There is no hernia in the ventral area.      Comments: Obese anterior abdominal wall.   Musculoskeletal:         General: No swelling or tenderness.      Right shoulder: No deformity, bony tenderness or crepitus. Normal range of motion. Normal strength. Normal pulse.      Cervical back: Normal range of motion.      Comments: No pedal edema nor calf swelling or tenderness.   Lymphadenopathy:      Cervical: No cervical adenopathy.   Skin:     General: Skin is warm and dry.      Coloration: Skin is not jaundiced or pale.      Findings: No bruising, ecchymosis, erythema, petechiae or rash.      Nails: There is no clubbing.   Neurological:      General: No focal deficit present.      Mental Status: She is alert and oriented to person, place, and time.      Cranial Nerves: No cranial nerve deficit.      Sensory: Sensation is intact. No sensory deficit.      Motor:  Motor function is intact. No tremor, atrophy or abnormal muscle tone.      Coordination: Coordination is intact. Coordination normal.      Gait: Gait normal.      Deep Tendon Reflexes: Reflexes are normal and symmetric. Reflexes normal.   Psychiatric:         Behavior: Behavior normal. Behavior is cooperative.         Thought Content: Thought content normal.         Judgment: Judgment normal.       Lab Review:      Latest Reference Range & Units 04/11/22 08:53 04/11/22 09:45 04/11/22 14:13 07/07/22 16:30   WBC 3.90 - 12.70 K/uL  6.34     RBC 4.00 - 5.40 M/uL  4.71     Hemoglobin 12.0 - 16.0 g/dL  13.7     Hematocrit 37.0 - 48.5 %  42.7     MCV 82 - 98 fL  91     MCH 27.0 - 31.0 pg  29.1     MCHC 32.0 - 36.0 g/dL  32.1     RDW 11.5 - 14.5 %  12.9     Platelets 150 - 450 K/uL  335     MPV 9.2 - 12.9 fL  11.1     Gran % 38.0 - 73.0 %  59.5     Lymph % 18.0 - 48.0 %  30.0     Mono % 4.0 - 15.0 %  8.0     Eosinophil % 0.0 - 8.0 %  1.7     Basophil % 0.0 - 1.9 %  0.6     Immature Granulocytes 0.0 - 0.5 %  0.2     Gran # (ANC) 1.8 - 7.7 K/uL  3.8     Lymph # 1.0 - 4.8 K/uL  1.9     Mono # 0.3 - 1.0 K/uL  0.5     Eos # 0.0 - 0.5 K/uL  0.1     Baso # 0.00 - 0.20 K/uL  0.04     Immature Grans (Abs) 0.00 - 0.04 K/uL  0.01     nRBC 0 /100 WBC  0     Differential Method   Automated     Sodium 136 - 145 mmol/L  137     Potassium 3.5 - 5.1 mmol/L  4.2     Chloride 95 - 110 mmol/L  104     CO2 23 - 29 mmol/L  22 (L)     Anion Gap 8 - 16 mmol/L  11     BUN 6 - 20 mg/dL  17     Creatinine 0.5 - 1.4 mg/dL  0.6     eGFR if non African American >60 mL/min/1.73 m^2  >60.0     eGFR if African American >60 mL/min/1.73 m^2  >60.0     Glucose 70 - 110 mg/dL  86     Calcium 8.7 - 10.5 mg/dL  10.3     Ionized Calcium 1.06 - 1.42 mmol/L  1.27     Alkaline Phosphatase 55 - 135 U/L  56     PROTEIN TOTAL 6.0 - 8.4 g/dL  7.5     Albumin 3.5 - 5.2 g/dL  3.9     Uric Acid 2.4 - 5.7 mg/dL  5.3     BILIRUBIN TOTAL 0.1 - 1.0 mg/dL  0.3     AST 10 - 40  U/L  30     ALT 10 - 44 U/L  59 (H)     GGT 8 - 55 U/L  49     Ammonia 10 - 50 umol/L  44     Amylase 20 - 110 U/L  45     Lipase 4 - 60 U/L  28     Cholesterol 120 - 199 mg/dL  200 (H)     HDL 40 - 75 mg/dL  39 (L)     HDL/Cholesterol Ratio 20.0 - 50.0 %  19.5 (L)     LDL Cholesterol External 63.0 - 159.0 mg/dL  147.6     Non-HDL Cholesterol mg/dL  161     Total Cholesterol/HDL Ratio 2.0 - 5.0   5.1 (H)     Triglycerides 30 - 150 mg/dL  67     Lactate, Jose Eduardo 0.5 - 2.2 mmol/L  1.6     Vit D, 25-Hydroxy 30 - 96 ng/mL  36     ALDOSTERONE ng/dL  17.4     Androstenedione ng/dL  150     Hemoglobin A1C External 4.0 - 5.6 %   5.0    Estimated Avg Glucose 68 - 131 mg/dL   97    TSH 0.400 - 4.000 uIU/mL  3.781     PTH 9.0 - 77.0 pg/mL  49.7     AFP 0.0 - 8.4 ng/mL  <2.0     Albumin 3.6 - 5.1 g/dL  4.3     DHEA 1.330 - 7.780 ng/mL  2.686     DHEA-SO4 74.8 - 410.2 ug/dL  312.5     Estradiol See Text pg/mL  177     Estrogen pg/mL  451     FSH See Text mIU/mL  1.77     LH See Text mIU/mL  3.1     Progesterone See Text ng/mL  11.2     Prolactin 5.2 - 26.5 ng/mL  7.4     Sex Hormone Binding Globulin 17 - 124 nmol/L  41     Testosterone 2 - 45 ng/dL  27     Testosterone, Bioavailable 0.5 - 8.5 ng/dL  5.3     Testosterone, Free 0.2 - 5.0 pg/mL  2.7     Specimen UA  Urine, Clean Catch      Color, UA Yellow, Straw, Jess  Yellow      Appearance, UA Clear  Hazy !      Specific Gravity, UA 1.005 - 1.030  1.010      pH, UA 5.0 - 8.0  6.0      Protein, UA Negative  Negative      Glucose, UA Negative  Negative      Ketones, UA Negative  Trace !      Occult Blood UA Negative  Negative      NITRITE UA Negative  Negative      Bilirubin (UA) Negative  Negative      Leukocytes, UA Negative  Trace !      RBC, UA 0 - 4 /hpf 1      WBC, UA 0 - 5 /hpf 3      Bacteria, UA None-Occ /hpf Rare      Squam Epithel, UA /hpf 3      Microscopic Comment  SEE COMMENT      Creatinine, Urine 15.0 - 325.0 mg/dL 25.0      Microalbumin, Urine ug/mL <5.0       MICROALB/CREAT RATIO 0.0 - 30.0 ug/mg Unable to calculate      US SOFT TISSUE HEAD NECK THYROID     Rpt   Renin Activity ng/mL/h  3.6     (L): Data is abnormally low  (H): Data is abnormally high  !: Data is abnormal  Rpt: View report in Results Review for more information    Assessment:     1. Nodular thyroid disease  T4, Free    T3    Thyroglobulin    TSH      2. Dysmetabolic syndrome  Hemoglobin A1C    Insulin, Random      3. Polycystic ovarian syndrome  Insulin, Random      4. Hyperandrogenism        5. Thyroiditis        6. Prediabetes  Hemoglobin A1C      7. Hypovitaminosis D  Vitamin D    PTH, Intact    Calcium, Ionized      8. Goiter        9. Other depression        10. Anxiety        11. Hirsutism        12. Acquired hypothyroidism        13. Dysglycemia  Hemoglobin A1C      14. NAFLD (nonalcoholic fatty liver disease)  Misc Sendout Test, Blood Enhanced Liver Fibrosis test (ELF)      15. Transaminitis  Sedimentation rate    CRP, High Sensitivity    Misc Sendout Test, Blood Transforming Growth Factor Beta (TGF-B)    Interleukin-6      16. LINN (nonalcoholic steatohepatitis)  Sedimentation rate    CRP, High Sensitivity    Misc Sendout Test, Blood Transforming Growth Factor Beta (TGF-B)    Interleukin-6             Regarding hypothyroidism; Patient appears clinically euthyroid. To recheckTFTs today but based on results will need to restart LT4 repletion therapy.  Regarding thyroid nodular disease; For ffup thyroid USS 07/23.  2. Regarding hypovitaminosis D; to continue OTC vitamin d repletion and will recheck 25 [place] vit d levels to guide need for any dose adjustment.  3. Regarding morbid obesity; Dicussed with patient basic strategies for effective weight management and referred to dietician for indepth calorie deficit counseling. Provided the patient literature to assist with weight loss deficit plans. Encouraged to sustain low calorie diet but to transition fro keto to healthier meal plans like DASH,  Flexetarian or Mediterranean diet plans.  4. Regarding insulin resistance and dysmetabolic syndrome; to restart low dose metformin; 500mg BID with progression to 1000mg BID as tolerated and check basic secreening labs to evaluate current glycemic status.  5. Regarding hirsutism and PCOS; Discussed possible management options with the patient who wants to hold of on commencing OCPs at the present time. To repeat pelvic USS.  6. Regarding OSAS; encouraged to uses CPAP as much as possible.   7. Regarding depression; to continue lexapro as before.  Regarding hyperandrogensim; Pelvic USS showe features suggestive of possible hyperthecosis. To obtain Prempro suppression test to evaluate impact of same on androgen production.          Plan:       FFup in ~ 6mths

## 2022-10-26 LAB
25(OH)D3 SERPL-MCNC: 36 NG/ML (ref 30–100)
CA-I SERPL-MCNC: 5.2 MG/DL (ref 4.8–5.6)
ERYTHROCYTE [SEDIMENTATION RATE] IN BLOOD BY WESTERGREN METHOD: 19 MM/H
HBA1C MFR BLD: 5 % OF TOTAL HGB
PTH-INTACT SERPL-MCNC: 29 PG/ML (ref 16–77)
T4 FREE SERPL-MCNC: 1.1 NG/DL (ref 0.8–1.8)
TSH SERPL-ACNC: 4.13 MIU/L

## 2022-12-24 RX ORDER — SILVER SULFADIAZINE 10 G/1000G
1 CREAM TOPICAL 2 TIMES DAILY
Qty: 30 G | Refills: 0 | Status: SHIPPED | OUTPATIENT
Start: 2022-12-24 | End: 2023-01-03

## 2023-01-17 ENCOUNTER — PATIENT MESSAGE (OUTPATIENT)
Dept: ADMINISTRATIVE | Facility: HOSPITAL | Age: 43
End: 2023-01-17
Payer: COMMERCIAL

## 2023-01-28 ENCOUNTER — PATIENT MESSAGE (OUTPATIENT)
Dept: ENDOCRINOLOGY | Facility: CLINIC | Age: 43
End: 2023-01-28
Payer: COMMERCIAL

## 2023-05-12 ENCOUNTER — OFFICE VISIT (OUTPATIENT)
Dept: ENDOCRINOLOGY | Facility: CLINIC | Age: 43
End: 2023-05-12
Payer: COMMERCIAL

## 2023-05-12 VITALS
TEMPERATURE: 98 F | BODY MASS INDEX: 41.97 KG/M2 | SYSTOLIC BLOOD PRESSURE: 120 MMHG | HEIGHT: 60 IN | DIASTOLIC BLOOD PRESSURE: 78 MMHG | WEIGHT: 213.75 LBS | HEART RATE: 61 BPM | OXYGEN SATURATION: 98 %

## 2023-05-12 DIAGNOSIS — E78.2 MIXED HYPERLIPIDEMIA: ICD-10-CM

## 2023-05-12 DIAGNOSIS — E06.9 THYROIDITIS: ICD-10-CM

## 2023-05-12 DIAGNOSIS — K76.0 NAFLD (NONALCOHOLIC FATTY LIVER DISEASE): ICD-10-CM

## 2023-05-12 DIAGNOSIS — E03.9 ACQUIRED HYPOTHYROIDISM: Primary | ICD-10-CM

## 2023-05-12 DIAGNOSIS — E28.8 HYPERANDROGENISM: ICD-10-CM

## 2023-05-12 DIAGNOSIS — E88.819 INSULIN RESISTANCE: ICD-10-CM

## 2023-05-12 DIAGNOSIS — N83.8 OVARIAN ENLARGEMENT, RIGHT: ICD-10-CM

## 2023-05-12 DIAGNOSIS — E04.1 NODULAR THYROID DISEASE: ICD-10-CM

## 2023-05-12 DIAGNOSIS — R79.89 ABNORMAL THYROID BLOOD TEST: ICD-10-CM

## 2023-05-12 DIAGNOSIS — E04.9 GOITER: ICD-10-CM

## 2023-05-12 DIAGNOSIS — E28.2 POLYCYSTIC OVARIAN SYNDROME: ICD-10-CM

## 2023-05-12 DIAGNOSIS — G47.33 OBSTRUCTIVE SLEEP APNEA: ICD-10-CM

## 2023-05-12 DIAGNOSIS — N83.8 OVARIAN ENLARGEMENT, LEFT: ICD-10-CM

## 2023-05-12 DIAGNOSIS — F41.9 ANXIETY: ICD-10-CM

## 2023-05-12 DIAGNOSIS — E88.810 DYSMETABOLIC SYNDROME: ICD-10-CM

## 2023-05-12 DIAGNOSIS — R73.03 PREDIABETES: ICD-10-CM

## 2023-05-12 DIAGNOSIS — F32.89 OTHER DEPRESSION: ICD-10-CM

## 2023-05-12 DIAGNOSIS — E55.9 HYPOVITAMINOSIS D: ICD-10-CM

## 2023-05-12 DIAGNOSIS — L68.0 HIRSUTISM: ICD-10-CM

## 2023-05-12 PROCEDURE — 99214 OFFICE O/P EST MOD 30 MIN: CPT | Mod: S$GLB,,, | Performed by: INTERNAL MEDICINE

## 2023-05-12 PROCEDURE — 3078F PR MOST RECENT DIASTOLIC BLOOD PRESSURE < 80 MM HG: ICD-10-PCS | Mod: CPTII,S$GLB,, | Performed by: INTERNAL MEDICINE

## 2023-05-12 PROCEDURE — 1159F MED LIST DOCD IN RCRD: CPT | Mod: CPTII,S$GLB,, | Performed by: INTERNAL MEDICINE

## 2023-05-12 PROCEDURE — 99999 PR PBB SHADOW E&M-EST. PATIENT-LVL V: ICD-10-PCS | Mod: PBBFAC,,, | Performed by: INTERNAL MEDICINE

## 2023-05-12 PROCEDURE — 3008F PR BODY MASS INDEX (BMI) DOCUMENTED: ICD-10-PCS | Mod: CPTII,S$GLB,, | Performed by: INTERNAL MEDICINE

## 2023-05-12 PROCEDURE — 3074F SYST BP LT 130 MM HG: CPT | Mod: CPTII,S$GLB,, | Performed by: INTERNAL MEDICINE

## 2023-05-12 PROCEDURE — 3008F BODY MASS INDEX DOCD: CPT | Mod: CPTII,S$GLB,, | Performed by: INTERNAL MEDICINE

## 2023-05-12 PROCEDURE — 3078F DIAST BP <80 MM HG: CPT | Mod: CPTII,S$GLB,, | Performed by: INTERNAL MEDICINE

## 2023-05-12 PROCEDURE — 1160F PR REVIEW ALL MEDS BY PRESCRIBER/CLIN PHARMACIST DOCUMENTED: ICD-10-PCS | Mod: CPTII,S$GLB,, | Performed by: INTERNAL MEDICINE

## 2023-05-12 PROCEDURE — 3074F PR MOST RECENT SYSTOLIC BLOOD PRESSURE < 130 MM HG: ICD-10-PCS | Mod: CPTII,S$GLB,, | Performed by: INTERNAL MEDICINE

## 2023-05-12 PROCEDURE — 1160F RVW MEDS BY RX/DR IN RCRD: CPT | Mod: CPTII,S$GLB,, | Performed by: INTERNAL MEDICINE

## 2023-05-12 PROCEDURE — 1159F PR MEDICATION LIST DOCUMENTED IN MEDICAL RECORD: ICD-10-PCS | Mod: CPTII,S$GLB,, | Performed by: INTERNAL MEDICINE

## 2023-05-12 PROCEDURE — 99214 PR OFFICE/OUTPT VISIT, EST, LEVL IV, 30-39 MIN: ICD-10-PCS | Mod: S$GLB,,, | Performed by: INTERNAL MEDICINE

## 2023-05-12 PROCEDURE — 99999 PR PBB SHADOW E&M-EST. PATIENT-LVL V: CPT | Mod: PBBFAC,,, | Performed by: INTERNAL MEDICINE

## 2023-05-12 NOTE — PROGRESS NOTES
Subjective:      Patient ID: Meagan Bran is a 42 y.o. female.    Chief Complaint:       Patient is a 42 yr old lady seen in [facility] today  with hypothyroidism, PCOS/other ovarian hyperandrogenic syndrome and morbid obesity seen in [facility] today.    History of Present Illness    The patient, Ms Bran is a 42 yr old lady seen in [facility] today on account of hypothyroidism presumably due to autoimmune thyroiditis for which she is on thyroid hormone repletion; LT4 75mcg Qd. She in addition has OSAS does not use a CPAP mask presently (difficulty tolerating same), hypovitaminosis D, morbid obesity and ?? Depression on treatment.    Labs done @ [facility] show elevated total testosterone; 59ng/ml (8-48), TSH of 3.37 and LH/FSH of 6.4/3.7 with normal prolactin; 9.3, elevated insulin levels; 49 and DHEAs; 209.3 which is wnl. These labs were from 01/16.  Her prior pelvic USS from 2011 was essentially normal with no evidence of cystic ovarian disease noted. Overall findings are consistent with PCOS with hyperandrogenism. She is presently not on the prescribed low dose metformin (500mg TWICE DAILY) nor the Lt4 75mcg DAILY. She has been of these for several months now.  Her screening thyroid USs from 07/16 showed sub cm thyroid nodular disease but one of the nodules had some calcifications noted (exact morphology not stated).   Her most recent thyroid USS from 07/22 showed stable thyroid nodular disease and her next [facility] thyroid USS should be for ~ 07/23.    Patients last period was 10/14 and this was associated with menorrhagia and last ~ 7 days. She is yet to have her November period.  She has some mental fogginess and fatigue which are chronic.  Her baseline [place] score is 8.The patient has apparently been of metformin ( she apparently never started taking it) and levothyroxine by her self decision for several months now.  She has gained an additional 30+ lbs since the last time she was seen in the system from 06/18.  She has been  amenorrhiec for ~ 10 months now. She has also noted increase in her hirsustism since her last visit. Patients father had bariatic surgery.   Patient has two sisters. Patients older sister apparently has PCOS and her younger sister had one miscarraige.     Patient's measured RMR from 11/3/2020 is 2060kcal/day. This is quite close and comparable to the estimated BMR (2193 Kcal/day)  from her prior body composition studies and the [place] equation computations.  This suggests an estimated DILLON of ~ 2575 kcal /day.   Her daily caloric intake thus based on this to achieve a significant caloric deficit to enable sustained weight loss would be ~ 7978-5269 kcal/day.  She has lost 83lbs over the last year. She has been on the keto diet since 09/22.  Since then her periods have become more consistent. So far this year she has 3 periods.   She admits that she has not been taking the metformin nor thyroid medication.  She has had her periods normalize as well k: 5-7/30-32. She has menorrhagia ++ and mild dysmennorhea.  The hirsutism is the same.    Patient has lost 103lbs since her last visit. She had commence a reduced caloric intake.   I have reviewed the results of the recent Liver fibroscan obtained on the patient @ [facility] on 07/28/22.  The results of the scan in full are scanned into the media tab section of the patients Epic records.  The salient finding of note is that her focal median shear pressure and velocity are 4.4 KPa and 1.2m/s respectively consistent with normal (F0) fibrosis score.  This is an excellent result and will inform patient of same.    Review of Systems   Constitutional:  Positive for unexpected weight change (interval weight gain). Negative for activity change, appetite change, diaphoresis and fatigue.   HENT:  Negative for facial swelling, hearing loss, trouble swallowing and voice change.    Eyes:  Negative for photophobia and visual disturbance.   Respiratory:  Negative for cough and shortness  of breath.    Cardiovascular:  Negative for chest pain, palpitations and leg swelling.   Gastrointestinal:  Negative for abdominal distention, abdominal pain, constipation, diarrhea, nausea and vomiting.   Endocrine: Negative for cold intolerance, heat intolerance, polydipsia, polyphagia and polyuria.   Genitourinary:  Positive for menstrual problem (amenorrheic). Negative for difficulty urinating, dysuria, flank pain, frequency, hematuria and urgency.   Musculoskeletal:  Negative for arthralgias, back pain, gait problem, neck pain and neck stiffness.   Skin:  Negative for color change, pallor and rash (Hirsutism which is gradually worsening.).   Neurological:  Negative for dizziness, tremors, seizures, syncope, weakness, light-headedness, numbness and headaches.   Hematological:  Does not bruise/bleed easily.   Psychiatric/Behavioral:  Negative for agitation, confusion, dysphoric mood, hallucinations and sleep disturbance. The patient is not nervous/anxious.      Objective:     /78 (BP Location: Left arm, Patient Position: Sitting, BP Method: Medium (Manual))   Pulse 61   Temp 98.2 °F (36.8 °C) (Oral)   Ht 5' (1.524 m)   Wt 97 kg (213 lb 11.8 oz)   SpO2 98%   BMI 41.74 kg/m²  Compared to a year ago she has an ~ 32 lb weight deficit.           Physical Exam  Vitals and nursing note reviewed.   Constitutional:       General: She is not in acute distress.     Appearance: She is well-developed. She is obese. She is not ill-appearing or diaphoretic.      Comments: Pleasant young lady. Obese. Not pale, anicteric and afebrile. Well hydrated. Not in any acute distress.  Mildly hirsuite with associated hypertrichosis.   HENT:      Head: Normocephalic and atraumatic. Not macrocephalic. No right periorbital erythema or left periorbital erythema. Hair is normal.        Comments: Nuchal AN.     Nose: Nose normal.      Mouth/Throat:      Mouth: Mucous membranes are not pale and not dry.      Pharynx: No oropharyngeal  exudate.   Eyes:      General: Lids are normal. No scleral icterus.        Right eye: No discharge.         Left eye: No discharge.      Conjunctiva/sclera: Conjunctivae normal.      Pupils: Pupils are equal, round, and reactive to light.   Neck:      Thyroid: No thyromegaly.      Vascular: Normal carotid pulses. No carotid bruit or JVD.      Trachea: No tracheal deviation.        Comments: Nuchal AN with multiple small skin tags in the neck and upper chest area.  Cardiovascular:      Rate and Rhythm: Normal rate and regular rhythm.      Chest Wall: PMI is not displaced.      Pulses: Normal pulses.      Heart sounds: Normal heart sounds, S1 normal and S2 normal. No murmur heard.    No gallop.   Pulmonary:      Effort: Pulmonary effort is normal. No respiratory distress.      Breath sounds: Normal breath sounds. No stridor. No wheezing, rhonchi or rales.   Abdominal:      General: Bowel sounds are normal. There is no distension.      Palpations: Abdomen is soft. There is no hepatomegaly, splenomegaly or mass.      Tenderness: There is no abdominal tenderness. There is no guarding or rebound.      Hernia: No hernia is present. There is no hernia in the ventral area.      Comments: Obese anterior abdominal wall.   Musculoskeletal:         General: No swelling or tenderness.      Right shoulder: No deformity, bony tenderness or crepitus. Normal range of motion. Normal strength. Normal pulse.      Cervical back: Normal range of motion.      Comments: No pedal edema nor calf swelling or tenderness.   Lymphadenopathy:      Cervical: No cervical adenopathy.   Skin:     General: Skin is warm and dry.      Coloration: Skin is not jaundiced or pale.      Findings: No bruising, ecchymosis, erythema, petechiae or rash.      Nails: There is no clubbing.   Neurological:      General: No focal deficit present.      Mental Status: She is alert and oriented to person, place, and time.      Cranial Nerves: No cranial nerve deficit.       Sensory: Sensation is intact. No sensory deficit.      Motor: Motor function is intact. No tremor, atrophy or abnormal muscle tone.      Coordination: Coordination is intact. Coordination normal.      Gait: Gait normal.      Deep Tendon Reflexes: Reflexes are normal and symmetric. Reflexes normal.   Psychiatric:         Behavior: Behavior normal. Behavior is cooperative.         Thought Content: Thought content normal.         Judgment: Judgment normal.       Lab Review:      Latest Reference Range & Units 04/11/22 08:53 04/11/22 09:45 04/11/22 14:13 07/07/22 16:30 10/25/22 10:44 10/25/22 10:53   WBC 3.90 - 12.70 K/uL  6.34       RBC 4.00 - 5.40 M/uL  4.71       Hemoglobin 12.0 - 16.0 g/dL  13.7       Hematocrit 37.0 - 48.5 %  42.7       MCV 82 - 98 fL  91       MCH 27.0 - 31.0 pg  29.1       MCHC 32.0 - 36.0 g/dL  32.1       RDW 11.5 - 14.5 %  12.9       Platelets 150 - 450 K/uL  335       MPV 9.2 - 12.9 fL  11.1       Gran % 38.0 - 73.0 %  59.5       Lymph % 18.0 - 48.0 %  30.0       Mono % 4.0 - 15.0 %  8.0       Eosinophil % 0.0 - 8.0 %  1.7       Basophil % 0.0 - 1.9 %  0.6       Immature Granulocytes 0.0 - 0.5 %  0.2       Gran # (ANC) 1.8 - 7.7 K/uL  3.8       Lymph # 1.0 - 4.8 K/uL  1.9       Mono # 0.3 - 1.0 K/uL  0.5       Eos # 0.0 - 0.5 K/uL  0.1       Baso # 0.00 - 0.20 K/uL  0.04       Immature Grans (Abs) 0.00 - 0.04 K/uL  0.01       nRBC 0 /100 WBC  0       Differential Method   Automated       Sed Rate < OR = 20 mm/h      19   Sodium 136 - 145 mmol/L  137       Potassium 3.5 - 5.1 mmol/L  4.2       Chloride 95 - 110 mmol/L  104       CO2 23 - 29 mmol/L  22 (L)       Anion Gap 8 - 16 mmol/L  11       BUN 6 - 20 mg/dL  17       Creatinine 0.5 - 1.4 mg/dL  0.6       eGFR if non African American >60 mL/min/1.73 m^2  >60.0       eGFR if African American >60 mL/min/1.73 m^2  >60.0       Glucose 70 - 110 mg/dL  86       Calcium 8.7 - 10.5 mg/dL  10.3       Ionized Calcium 4.8 - 5.6 mg/dL  1.27    5.2    Alkaline Phosphatase 55 - 135 U/L  56       PROTEIN TOTAL 6.0 - 8.4 g/dL  7.5       Albumin 3.5 - 5.2 g/dL  3.9       Albumin 3.6 - 5.1 g/dL  4.3       Uric Acid 2.4 - 5.7 mg/dL  5.3       BILIRUBIN TOTAL 0.1 - 1.0 mg/dL  0.3       AST 10 - 40 U/L  30       ALT 10 - 44 U/L  59 (H)       GGT 8 - 55 U/L  49       Ammonia 10 - 50 umol/L  44       Amylase 20 - 110 U/L  45       Lipase 4 - 60 U/L  28       Cholesterol 120 - 199 mg/dL  200 (H)       HDL 40 - 75 mg/dL  39 (L)       HDL/Cholesterol Ratio 20.0 - 50.0 %  19.5 (L)       LDL Cholesterol External 63.0 - 159.0 mg/dL  147.6       Non-HDL Cholesterol mg/dL  161       Total Cholesterol/HDL Ratio 2.0 - 5.0   5.1 (H)       Triglycerides 30 - 150 mg/dL  67       Lactate, Jose Eduardo 0.5 - 2.2 mmol/L  1.6       Vit D, 25-Hydroxy 30 - 96 ng/mL  36       ALDOSTERONE ng/dL  17.4       Androstenedione ng/dL  150       Hemoglobin A1C External <5.7 % of total Hgb   5.0   5.0   Estimated Avg Glucose 68 - 131 mg/dL   97      TSH mIU/L  3.781    4.13   T4, Free 0.8 - 1.8 ng/dL      1.1   PTH 16 - 77 pg/mL  49.7   29    AFP 0.0 - 8.4 ng/mL  <2.0       DHEA 1.330 - 7.780 ng/mL  2.686       DHEA-SO4 74.8 - 410.2 ug/dL  312.5       Estradiol See Text pg/mL  177       Estrogen pg/mL  451       FSH See Text mIU/mL  1.77       LH See Text mIU/mL  3.1       Progesterone See Text ng/mL  11.2       Prolactin 5.2 - 26.5 ng/mL  7.4       Sex Hormone Binding Globulin 17 - 124 nmol/L  41       Testosterone 2 - 45 ng/dL  27       Testosterone, Bioavailable 0.5 - 8.5 ng/dL  5.3       Testosterone, Free 0.2 - 5.0 pg/mL  2.7       Specimen UA  Urine, Clean Catch        Color, UA Yellow, Straw, Jess  Yellow        Appearance, UA Clear  Hazy !        Specific Gravity, UA 1.005 - 1.030  1.010        pH, UA 5.0 - 8.0  6.0        Protein, UA Negative  Negative        Glucose, UA Negative  Negative        Ketones, UA Negative  Trace !        Occult Blood UA Negative  Negative        NITRITE UA Negative   Negative        Bilirubin (UA) Negative  Negative        Leukocytes, UA Negative  Trace !        RBC, UA 0 - 4 /hpf 1        WBC, UA 0 - 5 /hpf 3        Bacteria, UA None-Occ /hpf Rare        Squam Epithel, UA /hpf 3        Microscopic Comment  SEE COMMENT        Creatinine, Urine 15.0 - 325.0 mg/dL 25.0        Urine Microalbumin ug/mL <5.0        MICROALB/CREAT RATIO 0.0 - 30.0 ug/mg Unable to calculate        US SOFT TISSUE HEAD NECK THYROID     Rpt     Renin Activity ng/mL/h  3.6       Vitamin D, 25-OH, Total 30 - 100 ng/mL      36   (L): Data is abnormally low  (H): Data is abnormally high  !: Data is abnormal  Rpt: View report in Results Review for more information    Assessment:     1. Acquired hypothyroidism  T4, Free    T3    TSH    CBC Auto Differential    Lipid Panel      2. Ovarian enlargement, left  US Pelvis Complete Non OB    Estrogens, Total    Prolactin    Progesterone    Luteinizing Hormone    Follicle Stimulating Hormone    DHEA    DHEA-Sulfate      3. Ovarian enlargement, right  US Pelvis Complete Non OB    Estrogens, Total    Prolactin    Progesterone    Luteinizing Hormone    Follicle Stimulating Hormone    DHEA      4. Goiter  US Soft Tissue Head Neck Thyroid    Thyroglobulin    Calcitonin      5. Thyroiditis        6. Nodular thyroid disease  US Soft Tissue Head Neck Thyroid    Calcitonin      7. Hyperandrogenism  Testosterone Panel    Estradiol    DHEA      8. Polycystic ovarian syndrome  Comprehensive Metabolic Panel    Estrogens, Total    Prolactin    Progesterone    Luteinizing Hormone    Follicle Stimulating Hormone    DHEA      9. Dysmetabolic syndrome  Comprehensive Metabolic Panel    Lipid Panel    Amylase    Lipase    Uric Acid    Urinalysis    Hemoglobin A1C    Microalbumin/Creatinine Ratio, Urine      10. Prediabetes  Hemoglobin A1C    Insulin, Random      11. NAFLD (nonalcoholic fatty liver disease)  US Abdomen Complete    US Elastography Liver w/imaging    US Elastography Liver  w/imaging    Comprehensive Metabolic Panel    Amylase    Lipase    Sedimentation rate    AFP Tumor Marker    Lactic Acid, Plasma    Ammonia    Misc Sendout Test, Blood [facility] TGF B-1 (Transforming grwoth factor Beta 1)    Gamma GT    Misc Sendout Test, Blood Enhanced Liver Fibrosis (ELF) test      12. Obstructive sleep apnea  Aldosterone    Renin      13. Hypovitaminosis D  Calcium, Ionized    PTH, Intact    Vitamin D      14. Insulin resistance  Insulin, Random      15. Hirsutism  Testosterone Panel    Estradiol      16. Anxiety        17. Other depression        18. Abnormal thyroid blood test  AFP Tumor Marker      19. Mixed hyperlipidemia  CRP, High Sensitivity               Regarding hypothyroidism; Patient appears clinically euthyroid. To recheckTFTs today but based on results will need to restart LT4 repletion therapy.  Regarding thyroid nodular disease; For ffup thyroid USS 07/23.  2. Regarding hypovitaminosis D; to continue OTC vitamin d repletion and will recheck 25 OH vit d levels to guide need for any dose adjustment.  3. Regarding morbid obesity; Dicussed with patient basic strategies for effective weight management and referred to dietician for indepth calorie deficit counseling. Provided the patient literature to assist with weight loss deficit plans. Encouraged to sustain low calorie diet but to transition fro keto to healthier meal plans like DASH, Flexetarian or Mediterranean diet plans.  4. Regarding insulin resistance and dysmetabolic syndrome; to restart low dose metformin; 500mg BID with progression to 1000mg BID as tolerated and check basic secreening labs to evaluate current glycemic status.  5. Regarding hirsutism and PCOS; Discussed possible management options with the patient who wants to hold of on commencing OCPs at the present time. To repeat pelvic USS.  6. Regarding OSAS; encouraged to uses CPAP as much as possible.   7. Regarding depression; to continue lexapro as before.  Regarding  hyperandrogensim; Pelvic USS showe features suggestive of possible hyperthecosis. To obtain Prempro suppression test to evaluate impact of same on androgen production.    We will check the status of her TFTs and her ovarian functional status since she has been on thyroid hormone repletion and metformin for several months now.        Plan:       FFup in ~ 1 yrs

## 2023-05-17 ENCOUNTER — HOSPITAL ENCOUNTER (OUTPATIENT)
Dept: RADIOLOGY | Facility: HOSPITAL | Age: 43
Discharge: HOME | End: 2023-05-17
Attending: INTERNAL MEDICINE
Payer: COMMERCIAL

## 2023-05-17 DIAGNOSIS — N83.8 OVARIAN ENLARGEMENT, RIGHT: ICD-10-CM

## 2023-05-17 DIAGNOSIS — K76.0 NAFLD (NONALCOHOLIC FATTY LIVER DISEASE): ICD-10-CM

## 2023-05-17 DIAGNOSIS — N83.8 OVARIAN ENLARGEMENT, LEFT: ICD-10-CM

## 2023-05-17 LAB
TESTOST SERPL-MCNC: 27 NG/DL (ref 2–45)
THYROGLOB AB SERPL-ACNC: <1 IU/ML
THYROGLOB SERPL-MCNC: 12.9 NG/ML

## 2023-05-17 PROCEDURE — 76856 US EXAM PELVIC COMPLETE: CPT | Mod: TC

## 2023-05-17 PROCEDURE — 76700 US EXAM ABDOM COMPLETE: CPT | Mod: 26,,, | Performed by: RADIOLOGY

## 2023-05-17 PROCEDURE — 76856 US PELVIS COMPLETE NON OB: ICD-10-PCS | Mod: 26,,, | Performed by: RADIOLOGY

## 2023-05-17 PROCEDURE — 76700 US EXAM ABDOM COMPLETE: CPT | Mod: TC

## 2023-05-17 PROCEDURE — 76700 US ABDOMEN COMPLETE: ICD-10-PCS | Mod: 26,,, | Performed by: RADIOLOGY

## 2023-05-17 PROCEDURE — 76856 US EXAM PELVIC COMPLETE: CPT | Mod: 26,,, | Performed by: RADIOLOGY

## 2023-05-19 LAB
AFP-TM SERPL-MCNC: 1.7 NG/ML
AMMONIA PLAS-SCNC: 46 UMOL/L
CALCIT SERPL-MCNC: <2 PG/ML
CRP SERPL HS-MCNC: 2.4 MG/L
ESTROGEN SERPL-MCNC: 155.7 PG/ML
FSH SERPL-ACNC: 11.4 MIU/ML
INSULIN SERPL-ACNC: 7.6 UIU/ML
LACTATE SERPL-MCNC: 0.9 MMOL/L (ref 0.4–1.8)
LIPASE SERPL-CCNC: 25 U/L (ref 7–60)
U DHEA SERPL-MCNC: 694 NG/DL

## 2023-05-20 LAB
ALDOST SERPL-MCNC: 16 NG/DL
PTH-INTACT SERPL-MCNC: 45 PG/ML (ref 16–77)
RENIN PLAS-CCNC: 1.08 NG/ML/H (ref 0.25–5.82)

## 2023-05-24 ENCOUNTER — HOSPITAL ENCOUNTER (OUTPATIENT)
Dept: RADIOLOGY | Facility: HOSPITAL | Age: 43
Discharge: HOME | End: 2023-05-24
Attending: INTERNAL MEDICINE
Payer: COMMERCIAL

## 2023-05-24 DIAGNOSIS — E04.1 NODULAR THYROID DISEASE: ICD-10-CM

## 2023-05-24 DIAGNOSIS — E04.9 GOITER: ICD-10-CM

## 2023-05-24 PROCEDURE — 76536 US EXAM OF HEAD AND NECK: CPT | Mod: 26,,, | Performed by: RADIOLOGY

## 2023-05-24 PROCEDURE — 76536 US EXAM OF HEAD AND NECK: CPT | Mod: TC

## 2023-05-24 PROCEDURE — 76536 US SOFT TISSUE HEAD NECK THYROID: ICD-10-PCS | Mod: 26,,, | Performed by: RADIOLOGY

## 2023-05-25 LAB
25(OH)D3 SERPL-MCNC: 30 NG/ML (ref 30–100)
ALBUMIN SERPL-MCNC: 4.1 G/DL (ref 3.6–5.1)
ALBUMIN/CREAT UR: 6 MCG/MG CREAT
ALBUMIN/GLOB SERPL: 1.8 (CALC) (ref 1–2.5)
ALP SERPL-CCNC: 33 U/L (ref 31–125)
ALT SERPL-CCNC: 23 U/L (ref 6–29)
AMYLASE SERPL-CCNC: 39 U/L (ref 21–101)
APPEARANCE UR: CLEAR
AST SERPL-CCNC: 18 U/L (ref 10–30)
BASOPHILS # BLD AUTO: 29 CELLS/UL (ref 0–200)
BASOPHILS NFR BLD AUTO: 0.6 %
BILIRUB SERPL-MCNC: 0.5 MG/DL (ref 0.2–1.2)
BILIRUB UR QL STRIP: NEGATIVE
BUN SERPL-MCNC: 17 MG/DL (ref 7–25)
BUN/CREAT SERPL: NORMAL (CALC) (ref 6–22)
CA-I SERPL-MCNC: 5.1 MG/DL (ref 4.7–5.5)
CALCIUM SERPL-MCNC: 9.4 MG/DL (ref 8.6–10.2)
CHLORIDE SERPL-SCNC: 103 MMOL/L (ref 98–110)
CHOLEST SERPL-MCNC: 211 MG/DL
CHOLEST/HDLC SERPL: 3.9 (CALC)
CO2 SERPL-SCNC: 30 MMOL/L (ref 20–32)
COLOR UR: ABNORMAL
CREAT SERPL-MCNC: 0.54 MG/DL (ref 0.5–0.99)
CREAT UR-MCNC: 221 MG/DL (ref 20–275)
DHEA-S SERPL-MCNC: 254 MCG/DL (ref 19–237)
EGFR: 119 ML/MIN/1.73M2
EOSINOPHIL # BLD AUTO: 110 CELLS/UL (ref 15–500)
EOSINOPHIL NFR BLD AUTO: 2.3 %
ERYTHROCYTE [DISTWIDTH] IN BLOOD BY AUTOMATED COUNT: 12.8 % (ref 11–15)
ERYTHROCYTE [SEDIMENTATION RATE] IN BLOOD BY WESTERGREN METHOD: 2 MM/H
ESTRADIOL SERPL-MCNC: 537 PG/ML
GGT SERPL-CCNC: 26 U/L (ref 3–55)
GLOBULIN SER CALC-MCNC: 2.3 G/DL (CALC) (ref 1.9–3.7)
GLUCOSE SERPL-MCNC: 80 MG/DL (ref 65–99)
GLUCOSE UR QL STRIP: NEGATIVE
HBA1C MFR BLD: 4.8 % OF TOTAL HGB
HCT VFR BLD AUTO: 40.7 % (ref 35–45)
HDLC SERPL-MCNC: 54 MG/DL
HGB BLD-MCNC: 13.6 G/DL (ref 11.7–15.5)
HGB UR QL STRIP: NEGATIVE
KETONES UR QL STRIP: NEGATIVE
LDLC SERPL CALC-MCNC: 141 MG/DL (CALC)
LEUKOCYTE ESTERASE UR QL STRIP: NEGATIVE
LH SERPL-ACNC: 6.6 MIU/ML
LYMPHOCYTES # BLD AUTO: 1834 CELLS/UL (ref 850–3900)
LYMPHOCYTES NFR BLD AUTO: 38.2 %
MCH RBC QN AUTO: 29.9 PG (ref 27–33)
MCHC RBC AUTO-ENTMCNC: 33.4 G/DL (ref 32–36)
MCV RBC AUTO: 89.5 FL (ref 80–100)
MICROALBUMIN UR-MCNC: 1.3 MG/DL
MONOCYTES # BLD AUTO: 408 CELLS/UL (ref 200–950)
MONOCYTES NFR BLD AUTO: 8.5 %
NEUTROPHILS # BLD AUTO: 2419 CELLS/UL (ref 1500–7800)
NEUTROPHILS NFR BLD AUTO: 50.4 %
NITRITE UR QL STRIP: NEGATIVE
NONHDLC SERPL-MCNC: 157 MG/DL (CALC)
PH UR STRIP: 6 [PH] (ref 5–8)
PLATELET # BLD AUTO: 319 THOUSAND/UL (ref 140–400)
PMV BLD REES-ECKER: 10.2 FL (ref 7.5–12.5)
POTASSIUM SERPL-SCNC: 4.6 MMOL/L (ref 3.5–5.3)
PROGEST SERPL-MCNC: 0.7 NG/ML
PROLACTIN SERPL-MCNC: 12.8 NG/ML
PROT SERPL-MCNC: 6.4 G/DL (ref 6.1–8.1)
PROT UR QL STRIP: ABNORMAL
RBC # BLD AUTO: 4.55 MILLION/UL (ref 3.8–5.1)
SODIUM SERPL-SCNC: 137 MMOL/L (ref 135–146)
SP GR UR STRIP: 1.03 (ref 1–1.03)
T3 SERPL-MCNC: 93 NG/DL (ref 76–181)
T4 FREE SERPL-MCNC: 1 NG/DL (ref 0.8–1.8)
TRIGL SERPL-MCNC: 72 MG/DL
TSH SERPL-ACNC: 3.98 MIU/L
URATE SERPL-MCNC: 4.5 MG/DL (ref 2.5–7)
WBC # BLD AUTO: 4.8 THOUSAND/UL (ref 3.8–10.8)

## 2023-07-05 DIAGNOSIS — F41.9 ANXIETY: ICD-10-CM

## 2023-07-06 RX ORDER — ESCITALOPRAM OXALATE 10 MG/1
TABLET ORAL
Qty: 30 TABLET | Refills: 5 | Status: SHIPPED | OUTPATIENT
Start: 2023-07-06 | End: 2024-02-05 | Stop reason: SDUPTHER

## 2023-07-06 RX ORDER — HYDROXYZINE HYDROCHLORIDE 25 MG/1
TABLET, FILM COATED ORAL
Qty: 30 TABLET | Refills: 5 | Status: SHIPPED | OUTPATIENT
Start: 2023-07-06

## 2023-12-02 ENCOUNTER — PATIENT MESSAGE (OUTPATIENT)
Dept: FAMILY MEDICINE | Facility: CLINIC | Age: 43
End: 2023-12-02
Payer: COMMERCIAL

## 2023-12-02 DIAGNOSIS — J45.20 MILD INTERMITTENT ASTHMA WITHOUT COMPLICATION: ICD-10-CM

## 2023-12-04 RX ORDER — BUDESONIDE 0.5 MG/2ML
0.5 INHALANT ORAL DAILY
Qty: 100 ML | Refills: 11 | Status: CANCELLED | OUTPATIENT
Start: 2023-12-04

## 2023-12-04 RX ORDER — ALBUTEROL SULFATE 1.25 MG/3ML
1.25 SOLUTION RESPIRATORY (INHALATION) EVERY 6 HOURS PRN
Qty: 72 ML | Refills: 11 | Status: CANCELLED | OUTPATIENT
Start: 2023-12-04

## 2024-02-05 DIAGNOSIS — F41.9 ANXIETY: ICD-10-CM

## 2024-02-05 RX ORDER — ESCITALOPRAM OXALATE 10 MG/1
10 TABLET ORAL DAILY
Qty: 30 TABLET | Refills: 5 | Status: SHIPPED | OUTPATIENT
Start: 2024-02-05

## 2024-02-05 NOTE — TELEPHONE ENCOUNTER
Refill Routing Note   Medication(s) are not appropriate for processing by [facility] for the following reason(s):        Patient not seen by provider within 15 months    [facility] action(s):  Defer   Requires appointment : Yes               Appointments  past 12m or future 3m with PCP    Date Provider   Last Visit   3/22/2021 Julius Jeffers Jr., MD   Next Visit   7/8/2024 Julius Jeffers Jr., MD   ED visits in past 90 days: 0        Note composed:9:40 AM 02/05/2024

## 2024-02-05 NOTE — TELEPHONE ENCOUNTER
Care Due:                  Date            Visit Type   Department     Provider  --------------------------------------------------------------------------------    Last Visit: None Found      None         None Found                              EP -                              PRIMARY      [facility]  Next Visit: 07-      CARE ([facility])   PRACTICE       Julius Jeffers                                                            Last  Test          Frequency    Reason                     Performed    Due Date  --------------------------------------------------------------------------------    Office Visit  15 months..  EScitalopram.............  Not Found    Overdue    [employer] Embedded Care Due Messages. Reference number: [id number].   2/05/2024 7:11:38 AM CST

## 2024-05-13 ENCOUNTER — OFFICE VISIT (OUTPATIENT)
Dept: ENDOCRINOLOGY | Facility: CLINIC | Age: 44
End: 2024-05-13
Payer: COMMERCIAL

## 2024-05-13 VITALS
TEMPERATURE: 99 F | OXYGEN SATURATION: 98 % | DIASTOLIC BLOOD PRESSURE: 82 MMHG | BODY MASS INDEX: 41.81 KG/M2 | HEIGHT: 60 IN | SYSTOLIC BLOOD PRESSURE: 120 MMHG | HEART RATE: 76 BPM | WEIGHT: 212.94 LBS

## 2024-05-13 DIAGNOSIS — E04.1 NODULAR THYROID DISEASE: Primary | ICD-10-CM

## 2024-05-13 DIAGNOSIS — E55.9 HYPOVITAMINOSIS D: ICD-10-CM

## 2024-05-13 DIAGNOSIS — E28.2 PCOS (POLYCYSTIC OVARIAN SYNDROME): ICD-10-CM

## 2024-05-13 PROCEDURE — 3074F SYST BP LT 130 MM HG: CPT | Mod: CPTII,S$GLB,, | Performed by: PHYSICIAN ASSISTANT

## 2024-05-13 PROCEDURE — 1160F RVW MEDS BY RX/DR IN RCRD: CPT | Mod: CPTII,S$GLB,, | Performed by: PHYSICIAN ASSISTANT

## 2024-05-13 PROCEDURE — 99213 OFFICE O/P EST LOW 20 MIN: CPT | Mod: S$GLB,,, | Performed by: PHYSICIAN ASSISTANT

## 2024-05-13 PROCEDURE — 3008F BODY MASS INDEX DOCD: CPT | Mod: CPTII,S$GLB,, | Performed by: PHYSICIAN ASSISTANT

## 2024-05-13 PROCEDURE — 3079F DIAST BP 80-89 MM HG: CPT | Mod: CPTII,S$GLB,, | Performed by: PHYSICIAN ASSISTANT

## 2024-05-13 PROCEDURE — 1159F MED LIST DOCD IN RCRD: CPT | Mod: CPTII,S$GLB,, | Performed by: PHYSICIAN ASSISTANT

## 2024-05-13 PROCEDURE — 99999 PR PBB SHADOW E&M-EST. PATIENT-LVL IV: CPT | Mod: PBBFAC,,, | Performed by: PHYSICIAN ASSISTANT

## 2024-05-13 NOTE — PROGRESS NOTES
CC: Nodular thyroid Disease    HPI: Meagan Bran is a 43 y.o. female here for nodular thyroid disease along with pending conditions listed in the Visit Diagnosis. Diagnosed in 2016. No hx of neck radiation. No voice changes, dysphagia or sob. Previously on thyroid medication by but TFTs returned to normal w/out medication. Thyroid u/s showed tiny nodules.     + constipation,   No fatigue, palpitations, tremors, insomnia.     Cycles are regular.     PCOS  Pelvic u/s in 2023 showed enlarged ovaries.  Testosterone was elevated in the past.   + facial hair, acne.  Pelvic u/s   Walks 2x weekly.  Previously on metformin but stopped after large wt loss.    PMHx, PSHx: reviewed in epic.  Social Hx: no ETOH/tobacco use.     Wt Readings from Last 10 Encounters:   05/13/24 96.6 kg (212 lb 15.4 oz)   05/12/23 97 kg (213 lb 11.8 oz)   10/25/22 102.2 kg (225 lb 3.2 oz)   06/20/22 110 kg (242 lb 8.1 oz)   04/11/22 111.3 kg (245 lb 6 oz)   04/16/21 (!) 149 kg (328 lb 9.5 oz)   03/22/21 (!) 148.8 kg (328 lb 0.7 oz)   12/08/20 (!) 149.1 kg (328 lb 11.3 oz)   10/20/20 (!) 151.9 kg (334 lb 15.8 oz)   06/15/18 (!) 140.8 kg (310 lb 4.8 oz)      ROS:   Constitutional: No recent significant weight change  Eyes: No recent visual changes  Cardiovascular: Denies current anginal symptoms  Respiratory: Denies current respiratory difficulty  Gastrointestinal: Denies recent bowel disturbances  GenitoUrinary - No dysuria  Skin: No new skin rash  Neurologic: No focal neurologic complaints  Musculoskeletal: no joint pain  Endocrine: no polyphagia, polydipsia or polyuria  Remainder ROS negative       /82 (BP Location: Right arm, Patient Position: Sitting, BP Method: Large (Manual))   Pulse 76   Temp 98.6 °F (37 °C) (Oral)   Ht 5' (1.524 m)   Wt 96.6 kg (212 lb 15.4 oz)   SpO2 98%   BMI 41.59 kg/m²      Personally reviewed labs below:    Lab Results   Component Value Date    TSH 3.98 05/24/2023    [id number] 93 05/24/2023    FREET4 0.87  04/21/2021          Chemistry        Component Value Date/Time     05/24/2023 0000    K 4.6 05/24/2023 0000     05/24/2023 0000    CO2 30 05/24/2023 0000    BUN 17 05/24/2023 0000    CREATININE 0.54 05/24/2023 0000    GLU 80 05/24/2023 0000        Component Value Date/Time    CALCIUM 9.4 05/24/2023 0000    ALKPHOS 56 04/11/2022 0945    AST 18 05/24/2023 0000    ALT 23 05/24/2023 0000    BILITOT 0.5 05/24/2023 0000    ESTGFRAFRICA >60.0 04/11/2022 0945    EGFRNONAA >60.0 04/11/2022 0945           Lab Results   Component Value Date    HGBA1C 4.8 05/24/2023    HGBA1C 5.0 10/25/2022    HGBA1C 5.0 04/11/2022        PE:  GENERAL: Well developed, well nourished  RESPIRATORY: Normal effort,   NEURO: steady gait, CN ll-Xll grossly intact  PSYCH: normal mood and affect      Assessment/Plan:   1. Nodular thyroid disease  US Soft Tissue Head Neck    Testosterone Panel    CBC Auto Differential    Comprehensive Metabolic Panel    Hemoglobin A1C    T4, Free    TSH    CBC Auto Differential    Comprehensive Metabolic Panel    Hemoglobin A1C    T4, Free    TSH    Lipid Panel    Lipid Panel    Insulin, Random    Insulin, Random    Hemoglobin A1C    Hemoglobin A1C    CANCELED: TSH    CANCELED: T4, Free    CANCELED: Hemoglobin A1C    CANCELED: Comprehensive Metabolic Panel    CANCELED: CBC Auto Differential    CANCELED: Hemoglobin A1C    CANCELED: Insulin, Random    CANCELED: Lipid Panel      2. PCOS (polycystic ovarian syndrome)  Testosterone Panel    Testosterone Panel    Insulin, Random    Insulin, Random    CANCELED: Insulin, Random      3. Hypovitaminosis D  Vitamin D    Vitamin D    CANCELED: Vitamin D         Nodular Thyroid Disease  Repeat u/s  Can repeat q 3 years if stable  Check TFTs    PCOS  Cycles are regular  Previously on metformin but stopped.  Monitor  Check testosterone    Hypovitaminosis D  Check vd    Obesity  Body mass index is 41.59 kg/m².   Increase exercise to 30 min qd    FOLLOWUP  Thyroid  u/s  Insulin, A1c, tfts, test, cbc, cmp, lp, vd  F/u in 6 mths-insulin, test

## 2024-05-16 LAB
25(OH)D3+25(OH)D2 SERPL-MCNC: 27 NG/ML (ref 30–100)
ALBUMIN SERPL-MCNC: 4.3 G/DL (ref 3.6–5.1)
ALBUMIN/GLOB SERPL: 1.7 (CALC) (ref 1–2.5)
ALP SERPL-CCNC: 46 U/L (ref 31–125)
ALT SERPL-CCNC: 25 U/L (ref 6–29)
AST SERPL-CCNC: 18 U/L (ref 10–30)
BASOPHILS # BLD AUTO: 29 CELLS/UL (ref 0–200)
BASOPHILS NFR BLD AUTO: 0.5 %
BILIRUB SERPL-MCNC: 0.5 MG/DL (ref 0.2–1.2)
BUN SERPL-MCNC: 16 MG/DL (ref 7–25)
BUN/CREAT SERPL: NORMAL (CALC) (ref 6–22)
CALCIUM SERPL-MCNC: 9.4 MG/DL (ref 8.6–10.2)
CHLORIDE SERPL-SCNC: 105 MMOL/L (ref 98–110)
CHOLEST SERPL-MCNC: 220 MG/DL
CHOLEST/HDLC SERPL: 3.4 (CALC)
CO2 SERPL-SCNC: 23 MMOL/L (ref 20–32)
CREAT SERPL-MCNC: 0.5 MG/DL (ref 0.5–0.99)
EGFR: 121 ML/MIN/1.73M2
EOSINOPHIL # BLD AUTO: 87 CELLS/UL (ref 15–500)
EOSINOPHIL NFR BLD AUTO: 1.5 %
ERYTHROCYTE [DISTWIDTH] IN BLOOD BY AUTOMATED COUNT: 12.6 % (ref 11–15)
GLOBULIN SER CALC-MCNC: 2.5 G/DL (CALC) (ref 1.9–3.7)
GLUCOSE SERPL-MCNC: 89 MG/DL (ref 65–99)
HBA1C MFR BLD: 5.2 % OF TOTAL HGB
HCT VFR BLD AUTO: 40.6 % (ref 35–45)
HDLC SERPL-MCNC: 64 MG/DL
HGB BLD-MCNC: 13.3 G/DL (ref 11.7–15.5)
INSULIN SERPL-ACNC: 8.5 UIU/ML
LDLC SERPL CALC-MCNC: 138 MG/DL (CALC)
LYMPHOCYTES # BLD AUTO: 1450 CELLS/UL (ref 850–3900)
LYMPHOCYTES NFR BLD AUTO: 25 %
MCH RBC QN AUTO: 29.8 PG (ref 27–33)
MCHC RBC AUTO-ENTMCNC: 32.8 G/DL (ref 32–36)
MCV RBC AUTO: 90.8 FL (ref 80–100)
MONOCYTES # BLD AUTO: 539 CELLS/UL (ref 200–950)
MONOCYTES NFR BLD AUTO: 9.3 %
NEUTROPHILS # BLD AUTO: 3695 CELLS/UL (ref 1500–7800)
NEUTROPHILS NFR BLD AUTO: 63.7 %
NONHDLC SERPL-MCNC: 156 MG/DL (CALC)
PLATELET # BLD AUTO: 321 THOUSAND/UL (ref 140–400)
PMV BLD REES-ECKER: 10.8 FL (ref 7.5–12.5)
POTASSIUM SERPL-SCNC: 4.3 MMOL/L (ref 3.5–5.3)
PROT SERPL-MCNC: 6.8 G/DL (ref 6.1–8.1)
RBC # BLD AUTO: 4.47 MILLION/UL (ref 3.8–5.1)
SODIUM SERPL-SCNC: 138 MMOL/L (ref 135–146)
T4 FREE SERPL-MCNC: 0.9 NG/DL (ref 0.8–1.8)
TRIGL SERPL-MCNC: 79 MG/DL
TSH SERPL-ACNC: 3.59 MIU/L
WBC # BLD AUTO: 5.8 THOUSAND/UL (ref 3.8–10.8)

## 2024-05-20 ENCOUNTER — HOSPITAL ENCOUNTER (OUTPATIENT)
Dept: RADIOLOGY | Facility: HOSPITAL | Age: 44
Discharge: HOME | End: 2024-05-20
Attending: PHYSICIAN ASSISTANT
Payer: COMMERCIAL

## 2024-05-20 DIAGNOSIS — E04.1 NODULAR THYROID DISEASE: ICD-10-CM

## 2024-05-20 PROCEDURE — 76536 US EXAM OF HEAD AND NECK: CPT | Mod: 26,,, | Performed by: RADIOLOGY

## 2024-05-20 PROCEDURE — 76536 US EXAM OF HEAD AND NECK: CPT | Mod: TC

## 2024-07-08 ENCOUNTER — OFFICE VISIT (OUTPATIENT)
Dept: FAMILY MEDICINE | Facility: CLINIC | Age: 44
End: 2024-07-08
Payer: COMMERCIAL

## 2024-07-08 VITALS
TEMPERATURE: 99 F | DIASTOLIC BLOOD PRESSURE: 72 MMHG | HEART RATE: 63 BPM | OXYGEN SATURATION: 98 % | HEIGHT: 60 IN | BODY MASS INDEX: 42.07 KG/M2 | WEIGHT: 214.31 LBS | SYSTOLIC BLOOD PRESSURE: 100 MMHG

## 2024-07-08 DIAGNOSIS — E28.2 POLYCYSTIC OVARIAN SYNDROME: ICD-10-CM

## 2024-07-08 DIAGNOSIS — Z12.31 ENCOUNTER FOR SCREENING MAMMOGRAM FOR BREAST CANCER: ICD-10-CM

## 2024-07-08 DIAGNOSIS — F32.89 OTHER DEPRESSION: ICD-10-CM

## 2024-07-08 DIAGNOSIS — E66.01 SEVERE OBESITY (BMI >= 40): ICD-10-CM

## 2024-07-08 DIAGNOSIS — J45.20 MILD INTERMITTENT ASTHMA WITHOUT COMPLICATION: ICD-10-CM

## 2024-07-08 DIAGNOSIS — E03.9 ACQUIRED HYPOTHYROIDISM: Primary | ICD-10-CM

## 2024-07-08 PROCEDURE — 99999 PR PBB SHADOW E&M-EST. PATIENT-LVL III: CPT | Mod: PBBFAC,,, | Performed by: FAMILY MEDICINE

## 2024-07-08 PROCEDURE — 1159F MED LIST DOCD IN RCRD: CPT | Mod: CPTII,S$GLB,, | Performed by: FAMILY MEDICINE

## 2024-07-08 PROCEDURE — 3044F HG A1C LEVEL LT 7.0%: CPT | Mod: CPTII,S$GLB,, | Performed by: FAMILY MEDICINE

## 2024-07-08 PROCEDURE — 3078F DIAST BP <80 MM HG: CPT | Mod: CPTII,S$GLB,, | Performed by: FAMILY MEDICINE

## 2024-07-08 PROCEDURE — 3074F SYST BP LT 130 MM HG: CPT | Mod: CPTII,S$GLB,, | Performed by: FAMILY MEDICINE

## 2024-07-08 PROCEDURE — 3008F BODY MASS INDEX DOCD: CPT | Mod: CPTII,S$GLB,, | Performed by: FAMILY MEDICINE

## 2024-07-08 PROCEDURE — 1160F RVW MEDS BY RX/DR IN RCRD: CPT | Mod: CPTII,S$GLB,, | Performed by: FAMILY MEDICINE

## 2024-07-08 PROCEDURE — 99214 OFFICE O/P EST MOD 30 MIN: CPT | Mod: S$GLB,,, | Performed by: FAMILY MEDICINE

## 2024-07-10 NOTE — PROGRESS NOTES
Subjective     Patient ID: Meagan Bran is a 43 y.o. female.    Chief Complaint: Hypothyroidism, Asthma, and  polycystic ovarian disease     Patient presents here for annual follow-up of hypothyroidism, asthma, and polycystic ovary disease.  I last saw the patient in March of 2021.  Since I last saw her, she has lost 114 lb and her BMI is down to 41.85.  She did this by following a strict keto diet.  When looking back on her weight, she has been very stable over the last year.  She would like to lose more weight but has found it difficult since she has liberalized her diet somewhat.  We did discuss this and I suggested the 1st thing I would do would be to go back to her keto diet for approximately 3 months to see if she can get back to losing weight.  It is not good to stay on this diet all the time but she can go on and off this periodically to help her gradually lose weight.  Her hypothyroidism is stable off medication.  She has been followed by Endocrinology and the endocrinology note was reviewed.  Her most recent TSH was in May of 2024 and it was within the normal range.  Her asthma has been stable and she really has not had to use her medication that much.  In fact, she has stopped using her Advair Diskus on a daily basis and only uses it as needed.  She is followed by both Endocrine as well as Gynecology for her polycystic ovarian disease.  She was taken off of metformin after her significant weight loss and she has done well with this.  She does have a history of depression and is on Lexapro 10 mg daily.  She does have some issues occasionally and she states a discussion with her gynecologist they talked about  changing to another antidepressant.  I did tell her that she is on a low-dose of Lexapro so if we need to, we could go up to 20 mg daily and I would suggest this before I would change to another medication.  She will continue on her 10 mg dosage right now and if she decides that she wants to make a  change, she will let me know.  As far as health maintenance, she is up-to-date with all of her recommended screening exams and immunizations with the exception of hepatitis C screen, HIV screen, pneumococcal vaccine, COVID vaccine, and mammogram.  She has never had a mammogram although she has been encouraged to do so.  She is amenable to doing this at this time and an order has been placed.      Review of Systems   Constitutional:  Positive for unexpected weight change (Weight is decreased 114 lb in the last 3 years). Negative for chills, fatigue and fever.   HENT:  Negative for nasal congestion, postnasal drip and sore throat.    Respiratory:  Negative for cough, shortness of breath and wheezing.    Cardiovascular:  Negative for chest pain and palpitations.   Gastrointestinal:  Negative for abdominal pain, diarrhea, nausea and vomiting.   Musculoskeletal:  Negative for arthralgias and back pain.   Neurological:  Negative for dizziness, light-headedness and headaches.   Psychiatric/Behavioral:  Negative for sleep disturbance. The patient is not nervous/anxious.          Occasional mild depressive symptoms          Objective     Physical Exam  Vitals reviewed.   Constitutional:       General: She is not in acute distress.     Appearance: Normal appearance. She is well-developed. She is obese.   HENT:      Right Ear: Tympanic membrane and external ear normal.      Left Ear: Tympanic membrane and external ear normal.      Mouth/Throat:      Pharynx: Oropharynx is clear. No posterior oropharyngeal erythema.   Neck:      Thyroid: No thyromegaly.      Vascular: No carotid bruit.   Cardiovascular:      Rate and Rhythm: Normal rate and regular rhythm.      Pulses: Normal pulses.      Heart sounds: Normal heart sounds. No murmur heard.  Pulmonary:      Effort: Pulmonary effort is normal.      Breath sounds: Normal breath sounds. No wheezing or rales.   Musculoskeletal:         General: No tenderness. Normal range of motion.       Cervical back: Normal range of motion and neck supple.      Right lower leg: No edema.      Left lower leg: No edema.   Lymphadenopathy:      Cervical: No cervical adenopathy.   Neurological:      General: No focal deficit present.      Mental Status: She is alert and oriented to person, place, and time.      Cranial Nerves: No cranial nerve deficit.      Deep Tendon Reflexes: Reflexes are normal and symmetric.            Assessment and Plan     1. Acquired hypothyroidism    2. Mild intermittent asthma without complication    3. Polycystic ovarian syndrome    4. Severe obesity (BMI >= 40)    5. Other depression    6. Encounter for screening mammogram for breast cancer  -     Mammo Digital Screening Bilat; Future; Expected date: 07/08/2024        1.  Continue present medication as her hypothyroidism, polycystic ovarian disease, asthma, and depression are stable   2.   Recommend resuming keto diet for weight loss for a maximum of 3 months  3.  BMI has decreased from greater than 60 down to 41.85 but this is  still in severe obesity range but patient has done a great job of decreasing her weight.  4.  She will remain on Lexapro 10 mg daily for the present time and will contact me if she feels like she needs to increase  5.  Follow-up in 1 year         Follow up in about 1 year (around 7/8/2025).

## 2024-09-18 DIAGNOSIS — F41.9 ANXIETY: ICD-10-CM

## 2024-09-18 NOTE — TELEPHONE ENCOUNTER
No care due was identified.  [facility] Embedded Care Due Messages. Reference number: [id number].   9/18/2024 7:56:38 AM CDT

## 2024-09-19 RX ORDER — ESCITALOPRAM OXALATE 10 MG/1
10 TABLET ORAL DAILY
Qty: 90 TABLET | Refills: 3 | Status: SHIPPED | OUTPATIENT
Start: 2024-09-19

## 2024-09-19 NOTE — TELEPHONE ENCOUNTER
Refill Decision Note   Meagan Shant  is requesting a refill authorization.  Brief Assessment and Rationale for Refill:  Approve     Medication Therapy Plan:        Comments:     Note composed:6:47 AM 09/19/2024

## 2024-11-11 ENCOUNTER — PATIENT MESSAGE (OUTPATIENT)
Dept: ENDOCRINOLOGY | Facility: CLINIC | Age: 44
End: 2024-11-11
Payer: COMMERCIAL

## 2024-11-11 DIAGNOSIS — E88.819 INSULIN RESISTANCE: Primary | ICD-10-CM

## 2024-11-13 ENCOUNTER — OFFICE VISIT (OUTPATIENT)
Dept: ENDOCRINOLOGY | Facility: CLINIC | Age: 44
End: 2024-11-13
Payer: COMMERCIAL

## 2024-11-13 VITALS
WEIGHT: 205 LBS | HEIGHT: 60 IN | BODY MASS INDEX: 40.25 KG/M2 | SYSTOLIC BLOOD PRESSURE: 110 MMHG | DIASTOLIC BLOOD PRESSURE: 78 MMHG | OXYGEN SATURATION: 98 % | HEART RATE: 62 BPM | TEMPERATURE: 98 F

## 2024-11-13 DIAGNOSIS — E28.2 PCOS (POLYCYSTIC OVARIAN SYNDROME): ICD-10-CM

## 2024-11-13 DIAGNOSIS — E66.01 SEVERE OBESITY (BMI >= 40): ICD-10-CM

## 2024-11-13 DIAGNOSIS — E55.9 HYPOVITAMINOSIS D: ICD-10-CM

## 2024-11-13 DIAGNOSIS — E04.1 NODULAR THYROID DISEASE: Primary | ICD-10-CM

## 2024-11-13 PROCEDURE — 3078F DIAST BP <80 MM HG: CPT | Mod: CPTII,S$GLB,, | Performed by: PHYSICIAN ASSISTANT

## 2024-11-13 PROCEDURE — 3074F SYST BP LT 130 MM HG: CPT | Mod: CPTII,S$GLB,, | Performed by: PHYSICIAN ASSISTANT

## 2024-11-13 PROCEDURE — 3008F BODY MASS INDEX DOCD: CPT | Mod: CPTII,S$GLB,, | Performed by: PHYSICIAN ASSISTANT

## 2024-11-13 PROCEDURE — 1159F MED LIST DOCD IN RCRD: CPT | Mod: CPTII,S$GLB,, | Performed by: PHYSICIAN ASSISTANT

## 2024-11-13 PROCEDURE — 3044F HG A1C LEVEL LT 7.0%: CPT | Mod: CPTII,S$GLB,, | Performed by: PHYSICIAN ASSISTANT

## 2024-11-13 PROCEDURE — 99999 PR PBB SHADOW E&M-EST. PATIENT-LVL III: CPT | Mod: PBBFAC,,, | Performed by: PHYSICIAN ASSISTANT

## 2024-11-13 PROCEDURE — 99213 OFFICE O/P EST LOW 20 MIN: CPT | Mod: S$GLB,,, | Performed by: PHYSICIAN ASSISTANT

## 2024-11-13 PROCEDURE — 1160F RVW MEDS BY RX/DR IN RCRD: CPT | Mod: CPTII,S$GLB,, | Performed by: PHYSICIAN ASSISTANT

## 2024-11-13 NOTE — PROGRESS NOTES
CC: Nodular thyroid Disease    HPI: Meagan Bran is a 44 y.o. female here for nodular thyroid disease along with pending conditions listed in the Visit Diagnosis. Diagnosed in 2016. No hx of neck radiation. No voice changes, dysphagia or sob. Previously on thyroid medication by but TFTs returned to normal w/out medication. Thyroid u/s 5/24 showed tiny nodules.     + constipation,   No fatigue, palpitations, tremors, insomnia.     Cycles are regular.     PCOS  Pelvic u/s in 2023 showed enlarged ovaries.  Testosterone was elevated in the past.   + facial hair, acne.  On cream from derm for facial hair.   Pelvic u/s   Walks 2x weekly.  Previously on metformin but stopped after large wt loss.    PMHx, PSHx: reviewed in epic.  Social Hx: no ETOH/tobacco use.     Wt Readings from Last 10 Encounters:   11/13/24 93 kg (205 lb 0.4 oz)   07/08/24 97.2 kg (214 lb 4.6 oz)   05/13/24 96.6 kg (212 lb 15.4 oz)   05/12/23 97 kg (213 lb 11.8 oz)   10/25/22 102.2 kg (225 lb 3.2 oz)   06/20/22 110 kg (242 lb 8.1 oz)   04/11/22 111.3 kg (245 lb 6 oz)   04/16/21 (!) 149 kg (328 lb 9.5 oz)   03/22/21 (!) 148.8 kg (328 lb 0.7 oz)   12/08/20 (!) 149.1 kg (328 lb 11.3 oz)      ROS:   Constitutional: + wt loss 7 lbs  Eyes: No recent visual changes  Cardiovascular: Denies current anginal symptoms  Respiratory: Denies current respiratory difficulty  Gastrointestinal: Denies recent bowel disturbances  GenitoUrinary - No dysuria  Skin: No new skin rash  Neurologic: No focal neurologic complaints  Musculoskeletal: no joint pain  Endocrine: no polyphagia, polydipsia or polyuria  Remainder ROS negative       /78 (BP Location: Left arm, Patient Position: Sitting)   Pulse 62   Temp 98.3 °F (36.8 °C) (Oral)   Ht 5' (1.524 m)   Wt 93 kg (205 lb 0.4 oz)   SpO2 98%   BMI 40.04 kg/m²      Personally reviewed labs below:    Lab Results   Component Value Date    TSH 3.59 05/15/2024    [id number] 93 05/24/2023    FREET4 0.87 04/21/2021         Chemistry        Component Value Date/Time     05/15/2024 0728    K 4.3 05/15/2024 0728     05/15/2024 0728    CO2 23 05/15/2024 0728    BUN 16 05/15/2024 0728    CREATININE 0.50 05/15/2024 0728    GLU 89 05/15/2024 0728        Component Value Date/Time    CALCIUM 9.4 05/15/2024 0728    ALKPHOS 56 04/11/2022 0945    AST 18 05/15/2024 0728    ALT 25 05/15/2024 0728    BILITOT 0.5 05/15/2024 0728    ESTGFRAFRICA >60.0 04/11/2022 0945    EGFRNONAA >60.0 04/11/2022 0945         Lab Results   Component Value Date    HGBA1C 5.2 05/15/2024    HGBA1C 4.8 05/24/2023    HGBA1C 5.0 10/25/2022      PE:  GENERAL: Well developed, well nourished  RESPIRATORY: Normal effort,   NEURO: steady gait, CN ll-Xll grossly intact  PSYCH: normal mood and affect    Assessment/Plan:   1. Nodular thyroid disease        2. PCOS (polycystic ovarian syndrome)        3. Hypovitaminosis D        4. Severe obesity (BMI >= 40)           Nodular Thyroid Disease  stable  Repeat q 3 years   Check TFTs    PCOS  Cycles are regular  Previously on metformin but stopped.  Monitor  Check testosterone    Hypovitaminosis D  Check vd  Continue vd intake    Obesity  Body mass index is 40.04 kg/m².   Increase exercise to 30 min qd    FOLLOWUP  Testosterone free, tt, tfts, vd--quest  F/u in 6 mths-insulin, test, vd, tfts, a1c

## 2024-11-14 LAB — INSULIN SERPL-ACNC: 13.6 UIU/ML

## 2024-12-04 ENCOUNTER — PATIENT MESSAGE (OUTPATIENT)
Dept: ADMINISTRATIVE | Facility: CLINIC | Age: 44
End: 2024-12-04
Payer: COMMERCIAL

## 2025-05-28 ENCOUNTER — OFFICE VISIT (OUTPATIENT)
Dept: ENDOCRINOLOGY | Facility: CLINIC | Age: 45
End: 2025-05-28
Payer: COMMERCIAL

## 2025-05-28 DIAGNOSIS — J45.20 MILD INTERMITTENT ASTHMA WITHOUT COMPLICATION: ICD-10-CM

## 2025-05-28 DIAGNOSIS — E28.2 PCOS (POLYCYSTIC OVARIAN SYNDROME): ICD-10-CM

## 2025-05-28 DIAGNOSIS — E66.01 SEVERE OBESITY (BMI >= 40): ICD-10-CM

## 2025-05-28 DIAGNOSIS — E04.1 NODULAR THYROID DISEASE: Primary | ICD-10-CM

## 2025-05-28 DIAGNOSIS — E55.9 HYPOVITAMINOSIS D: ICD-10-CM

## 2025-05-28 PROCEDURE — 1159F MED LIST DOCD IN RCRD: CPT | Mod: CPTII,95,, | Performed by: PHYSICIAN ASSISTANT

## 2025-05-28 PROCEDURE — 98005 SYNCH AUDIO-VIDEO EST LOW 20: CPT | Mod: 95,,, | Performed by: PHYSICIAN ASSISTANT

## 2025-05-28 PROCEDURE — 1160F RVW MEDS BY RX/DR IN RCRD: CPT | Mod: CPTII,95,, | Performed by: PHYSICIAN ASSISTANT

## 2025-05-28 RX ORDER — ALBUTEROL SULFATE 90 UG/1
2 INHALANT RESPIRATORY (INHALATION) EVERY 6 HOURS PRN
Qty: 54 G | Refills: 0 | Status: SHIPPED | OUTPATIENT
Start: 2025-05-28

## 2025-05-28 RX ORDER — ALBUTEROL SULFATE 1.25 MG/3ML
1.25 SOLUTION RESPIRATORY (INHALATION) EVERY 6 HOURS PRN
Qty: 72 ML | Refills: 11 | Status: SHIPPED | OUTPATIENT
Start: 2025-05-28

## 2025-05-28 RX ORDER — BUDESONIDE 0.5 MG/2ML
0.5 INHALANT ORAL DAILY
Qty: 100 ML | Refills: 11 | Status: SHIPPED | OUTPATIENT
Start: 2025-05-28

## 2025-05-28 NOTE — PROGRESS NOTES
The patient location is: [place]  The chief complaint leading to consultation is: PCOS    Visit type: audiovisual    Face to Face time with patient: 8 min  20 minutes of total time spent on the encounter, which includes face to face time and non-face to face time preparing to see the patient (eg, review of tests), Obtaining and/or reviewing separately obtained history, Documenting clinical information in the electronic or other health record, Independently interpreting results (not separately reported) and communicating results to the patient/family/caregiver, or Care coordination (not separately reported).     Each patient to whom he or she provides medical services by telemedicine is:  (1) informed of the relationship between the physician and patient and the respective role of any other health care provider with respect to management of the patient; and (2) notified that he or she may decline to receive medical services by telemedicine and may withdraw from such care at any time.    CC: Nodular thyroid Disease    HPI: Meagan Bran is a 44 y.o. female here for nodular thyroid disease along with pending conditions listed in the Visit Diagnosis. Diagnosed in 2016. No hx of neck radiation. No voice changes, dysphagia or sob. Previously on thyroid medication by but TFTs returned to normal w/out medication. Thyroid u/s 5/24 showed tiny nodules. She has two sisters and they do not have facial hair.     PCOS  Pelvic u/s in 2023 showed enlarged ovaries.  Testosterone was elevated in the past.   + facial hair, acne.  On cream from derm for facial hair.   Pelvic u/s   Walks 2x weekly.  Previously on metformin but stopped after large wt loss.  Cycles are regular.     High TSH  +fatigue, hair loss.  No constipation/diarrhea, wt changes, cold intolerance.     PMHx, PSHx: reviewed in epic.  Social Hx: no ETOH/tobacco use.     Weighs 206 lbs today.  Wt Readings from Last 10 Encounters:   11/13/24 93 kg (205 lb 0.4 oz)    07/08/24 97.2 kg (214 lb 4.6 oz)   05/13/24 96.6 kg (212 lb 15.4 oz)   05/12/23 97 kg (213 lb 11.8 oz)   10/25/22 102.2 kg (225 lb 3.2 oz)   06/20/22 110 kg (242 lb 8.1 oz)   04/11/22 111.3 kg (245 lb 6 oz)   04/16/21 (!) 149 kg (328 lb 9.5 oz)   03/22/21 (!) 148.8 kg (328 lb 0.7 oz)   12/08/20 (!) 149.1 kg (328 lb 11.3 oz)      ROS:   Constitutional: + wt stable  Eyes: No recent visual changes  Cardiovascular: Denies current anginal symptoms  Respiratory: Denies current respiratory difficulty  Gastrointestinal: Denies recent bowel disturbances  GenitoUrinary - No dysuria  Skin: No new skin rash  Neurologic: No focal neurologic complaints  Musculoskeletal: no joint pain  Endocrine: no polyphagia, polydipsia or polyuria  Remainder ROS negative     There were no vitals taken for this visit.     Personally reviewed labs below:    Lab Results   Component Value Date    TSH 3.45 11/25/2024    [id number] 93 05/24/2023    FREET4 0.87 04/21/2021        Chemistry        Component Value Date/Time     05/15/2024 0728    K 4.3 05/15/2024 0728     05/15/2024 0728    CO2 23 05/15/2024 0728    BUN 16 05/15/2024 0728    CREATININE 0.50 05/15/2024 0728    GLU 89 05/15/2024 0728        Component Value Date/Time    CALCIUM 9.4 05/15/2024 0728    ALKPHOS 56 04/11/2022 0945    AST 18 05/15/2024 0728    ALT 25 05/15/2024 0728    BILITOT 0.5 05/15/2024 0728    ESTGFRAFRICA >60.0 04/11/2022 0945    EGFRNONAA >60.0 04/11/2022 0945         Lab Results   Component Value Date    HGBA1C 5.0 11/25/2024    HGBA1C 5.2 05/15/2024    HGBA1C 4.8 05/24/2023      PE:  GENERAL: Well developed, well nourished  RESPIRATORY: Normal effort,   NEURO: steady gait, CN ll-Xll grossly intact  PSYCH: normal mood and affect    Assessment/Plan:   1. Nodular thyroid disease  TSH    T4, Free    TSH    T4, Free      2. PCOS (polycystic ovarian syndrome)  Insulin, Random    TESTOSTERONE, FREE (DIALYSIS) AND TOTAL, LC/MS/MS    Insulin, Random    TESTOSTERONE,  FREE (DIALYSIS) AND TOTAL, LC/MS/MS      3. Hypovitaminosis D  Vitamin D    Vitamin D      4. Severe obesity (BMI >= 40)  Hemoglobin A1C    Hemoglobin A1C      5. Mild intermittent asthma without complication  albuterol (ACCUNEB) 1.25 mg/3 mL Nebu    budesonide (PULMICORT) 0.5 mg/2 mL nebulizer solution    albuterol (VENTOLIN HFA) 90 mcg/actuation inhaler           Nodular Thyroid Disease  stable  Repeat q 3 years   Has been on the high side of normal.  Will try LT4 if still elevated.  Check TFTs    PCOS  Cycles are regular  Previously on metformin but stopped.  Monitor  Check testosterone    Hypovitaminosis D  recheck  Continue vd intake    Obesity  There is no height or weight on file to calculate BMI.   Increase exercise to 30 min qd    Asthma  Continue budesonide/albuterol prn    FOLLOWUP  Testosterone free, tt, tfts, insulin, vd--quest  F/u in 6 mths-insulin, test, vd, tfts, a1c